# Patient Record
Sex: FEMALE | Race: WHITE | HISPANIC OR LATINO | Employment: UNEMPLOYED | URBAN - METROPOLITAN AREA
[De-identification: names, ages, dates, MRNs, and addresses within clinical notes are randomized per-mention and may not be internally consistent; named-entity substitution may affect disease eponyms.]

---

## 2018-04-18 LAB
AMORPHOUS MATERIAL (HISTORICAL): NORMAL
BACTERIA UR QL AUTO: NORMAL
BILIRUB UR QL STRIP: NEGATIVE MG/DL
CASTS/CASTS TYPE (HISTORICAL): NORMAL /LPF
CLARITY UR: CLEAR
COLOR UR: YELLOW
CRYSTAL TYPE (HISTORICAL): NORMAL /HPF
GLUCOSE UR STRIP-MCNC: NEGATIVE MG/DL
HGB UR QL STRIP.AUTO: ABNORMAL
KETONES UR STRIP-MCNC: NEGATIVE MG/DL
LEUKOCYTE ESTERASE UR QL STRIP: ABNORMAL
MUCOUS THREADS URNS QL MICRO: NORMAL
NITRITE UR QL STRIP: NEGATIVE
NON-SQ EPI CELLS URNS QL MICRO: NORMAL
OTHER STN SPEC: NORMAL
PH UR STRIP.AUTO: 6 [PH] (ref 4.5–8)
PROT UR STRIP-MCNC: NEGATIVE MG/DL
RBC #/AREA URNS AUTO: 2 /HPF
SP GR UR STRIP.AUTO: 1.02 (ref 1–1.04)
UROBILINOGEN UR QL STRIP.AUTO: NEGATIVE MG/DL (ref 0–1)
WBC #/AREA URNS AUTO: 2 /HPF

## 2018-04-30 LAB
ABSOL LYMPHOCYTES (HISTORICAL): 2 K/UL (ref 0.5–4)
ALBUMIN SERPL BCP-MCNC: 3.9 G/DL (ref 3–5.2)
ALP SERPL-CCNC: 62 U/L (ref 43–122)
ALT SERPL W P-5'-P-CCNC: 28 U/L (ref 9–52)
ANION GAP SERPL CALCULATED.3IONS-SCNC: 9 MMOL/L (ref 5–14)
AST SERPL W P-5'-P-CCNC: 17 U/L (ref 14–36)
BASOPHILS # BLD AUTO: 0 K/UL (ref 0–0.1)
BASOPHILS # BLD AUTO: 1 % (ref 0–1)
BILIRUB SERPL-MCNC: 0.7 MG/DL
BILIRUB UR QL STRIP: NEGATIVE MG/DL
BUN SERPL-MCNC: 15 MG/DL (ref 5–25)
CALCIUM SERPL-MCNC: 9.2 MG/DL (ref 8.4–10.2)
CHLORIDE SERPL-SCNC: 104 MEQ/L (ref 97–108)
CHOLEST SERPL-MCNC: 123 MG/DL
CHOLEST/HDLC SERPL: 3.8 {RATIO}
CLARITY UR: CLEAR
CO2 SERPL-SCNC: 29 MMOL/L (ref 22–30)
COLOR UR: YELLOW
CREATINE, SERUM (HISTORICAL): 0.61 MG/DL (ref 0.6–1.2)
DEPRECATED RDW RBC AUTO: 13.3 %
EGFR (HISTORICAL): >60 ML/MIN/1.73 M2
EOSINOPHIL # BLD AUTO: 0.1 K/UL (ref 0–0.4)
EOSINOPHIL NFR BLD AUTO: 2 % (ref 0–6)
GLUCOSE SERPL-MCNC: 94 MG/DL (ref 70–99)
GLUCOSE UR STRIP-MCNC: NEGATIVE MG/DL
HCT VFR BLD AUTO: 39.6 % (ref 36–46)
HDLC SERPL-MCNC: 32 MG/DL
HGB BLD-MCNC: 13 G/DL (ref 12–16)
HGB UR QL STRIP.AUTO: NEGATIVE
KETONES UR STRIP-MCNC: NEGATIVE MG/DL
LDL/HDL RATIO (HISTORICAL): 2.6
LDLC SERPL CALC-MCNC: 82 MG/DL
LEUKOCYTE ESTERASE UR QL STRIP: NEGATIVE
LYMPHOCYTES NFR BLD AUTO: 26 % (ref 25–45)
MCH RBC QN AUTO: 28 PG (ref 26–34)
MCHC RBC AUTO-ENTMCNC: 32.9 % (ref 31–36)
MCV RBC AUTO: 85 FL (ref 80–100)
MONOCYTES # BLD AUTO: 0.6 K/UL (ref 0.2–0.9)
MONOCYTES NFR BLD AUTO: 8 % (ref 1–10)
NEUTROPHILS ABS COUNT (HISTORICAL): 4.8 K/UL (ref 1.8–7.8)
NEUTS SEG NFR BLD AUTO: 63 % (ref 45–65)
NITRITE UR QL STRIP: NEGATIVE
PH UR STRIP.AUTO: 6 [PH] (ref 4.5–8)
PLATELET # BLD AUTO: 198 K/MCL (ref 150–450)
POTASSIUM SERPL-SCNC: 4.6 MEQ/L (ref 3.6–5)
PROT UR STRIP-MCNC: NEGATIVE MG/DL
RBC # BLD AUTO: 4.65 M/MCL (ref 4–5.2)
SODIUM SERPL-SCNC: 142 MEQ/L (ref 137–147)
SP GR UR STRIP.AUTO: 1.02 (ref 1–1.04)
TOTAL PROTEIN (HISTORICAL): 7.1 G/DL (ref 5.9–8.4)
TRIGL SERPL-MCNC: 44 MG/DL
TSH SERPL DL<=0.05 MIU/L-ACNC: 2.14 UIU/ML (ref 0.47–4.68)
UROBILINOGEN UR QL STRIP.AUTO: NEGATIVE MG/DL (ref 0–1)
VLDLC SERPL CALC-MCNC: 9 MG/DL (ref 0–40)
WBC # BLD AUTO: 7.5 K/MCL (ref 4.5–11)

## 2018-04-30 PROCEDURE — G0145 SCR C/V CYTO,THINLAYER,RESCR: HCPCS | Performed by: PATHOLOGY

## 2018-04-30 PROCEDURE — G0124 SCREEN C/V THIN LAYER BY MD: HCPCS | Performed by: PATHOLOGY

## 2018-04-30 PROCEDURE — 87624 HPV HI-RISK TYP POOLED RSLT: CPT | Performed by: FAMILY MEDICINE

## 2018-05-01 ENCOUNTER — LAB REQUISITION (OUTPATIENT)
Dept: LAB | Facility: HOSPITAL | Age: 38
End: 2018-05-01
Payer: COMMERCIAL

## 2018-05-01 DIAGNOSIS — Z01.419 ENCOUNTER FOR GYNECOLOGICAL EXAMINATION WITHOUT ABNORMAL FINDING: ICD-10-CM

## 2018-05-01 DIAGNOSIS — Z11.51 ENCOUNTER FOR SCREENING FOR HUMAN PAPILLOMAVIRUS (HPV): ICD-10-CM

## 2018-05-01 LAB
CANDIDA ANTIGEN DETECTION (HISTORICAL): NORMAL
CHLAMYDIA TRACHOMATIS DNA SDA,GENITAL (HISTORICAL): NORMAL
GARDNERELLA VAGINALIS (HISTORICAL): NORMAL
N GONORRHOEAE DNA SDA,GENITAL (HISTORICAL): NORMAL
TRICHOMONAS (HISTORICAL): NORMAL

## 2018-05-02 LAB — HPV RRNA GENITAL QL NAA+PROBE: ABNORMAL

## 2018-05-03 LAB
LAB AP GYN PRIMARY INTERPRETATION: NORMAL
LAB AP LMP: NORMAL
Lab: NORMAL
PATH INTERP SPEC-IMP: NORMAL

## 2021-04-30 ENCOUNTER — IMMUNIZATIONS (OUTPATIENT)
Dept: FAMILY MEDICINE CLINIC | Facility: HOSPITAL | Age: 41
End: 2021-04-30

## 2021-04-30 DIAGNOSIS — Z23 ENCOUNTER FOR IMMUNIZATION: Primary | ICD-10-CM

## 2021-04-30 PROCEDURE — 91301 SARS-COV-2 / COVID-19 MRNA VACCINE (MODERNA) 100 MCG: CPT

## 2021-04-30 PROCEDURE — 0011A SARS-COV-2 / COVID-19 MRNA VACCINE (MODERNA) 100 MCG: CPT

## 2021-05-27 ENCOUNTER — IMMUNIZATIONS (OUTPATIENT)
Dept: FAMILY MEDICINE CLINIC | Facility: HOSPITAL | Age: 41
End: 2021-05-27

## 2021-05-27 DIAGNOSIS — Z23 ENCOUNTER FOR IMMUNIZATION: Primary | ICD-10-CM

## 2021-05-27 PROCEDURE — 0012A SARS-COV-2 / COVID-19 MRNA VACCINE (MODERNA) 100 MCG: CPT

## 2021-05-27 PROCEDURE — 91301 SARS-COV-2 / COVID-19 MRNA VACCINE (MODERNA) 100 MCG: CPT

## 2022-03-24 ENCOUNTER — OFFICE VISIT (OUTPATIENT)
Dept: FAMILY MEDICINE CLINIC | Facility: CLINIC | Age: 42
End: 2022-03-24

## 2022-03-24 VITALS
TEMPERATURE: 97.1 F | DIASTOLIC BLOOD PRESSURE: 72 MMHG | SYSTOLIC BLOOD PRESSURE: 128 MMHG | RESPIRATION RATE: 18 BRPM | HEIGHT: 62 IN | HEART RATE: 83 BPM | BODY MASS INDEX: 50.35 KG/M2 | OXYGEN SATURATION: 99 % | WEIGHT: 273.6 LBS

## 2022-03-24 DIAGNOSIS — Z11.4 SCREENING FOR HIV (HUMAN IMMUNODEFICIENCY VIRUS): ICD-10-CM

## 2022-03-24 DIAGNOSIS — R13.10 DYSPHAGIA, UNSPECIFIED TYPE: ICD-10-CM

## 2022-03-24 DIAGNOSIS — K21.9 GASTROESOPHAGEAL REFLUX DISEASE, UNSPECIFIED WHETHER ESOPHAGITIS PRESENT: ICD-10-CM

## 2022-03-24 DIAGNOSIS — Z11.59 NEED FOR HEPATITIS C SCREENING TEST: ICD-10-CM

## 2022-03-24 DIAGNOSIS — E66.01 CLASS 3 SEVERE OBESITY DUE TO EXCESS CALORIES WITHOUT SERIOUS COMORBIDITY WITH BODY MASS INDEX (BMI) OF 50.0 TO 59.9 IN ADULT (HCC): ICD-10-CM

## 2022-03-24 DIAGNOSIS — Z00.00 HEALTHCARE MAINTENANCE: ICD-10-CM

## 2022-03-24 DIAGNOSIS — Z12.31 BREAST CANCER SCREENING BY MAMMOGRAM: ICD-10-CM

## 2022-03-24 DIAGNOSIS — Z00.00 ANNUAL PHYSICAL EXAM: Primary | ICD-10-CM

## 2022-03-24 DIAGNOSIS — Z13.31 DEPRESSION SCREEN: ICD-10-CM

## 2022-03-24 PROBLEM — E66.813 CLASS 3 SEVERE OBESITY DUE TO EXCESS CALORIES WITHOUT SERIOUS COMORBIDITY WITH BODY MASS INDEX (BMI) OF 50.0 TO 59.9 IN ADULT (HCC): Status: ACTIVE | Noted: 2022-03-24

## 2022-03-24 PROCEDURE — 99386 PREV VISIT NEW AGE 40-64: CPT | Performed by: PHYSICIAN ASSISTANT

## 2022-03-24 NOTE — ASSESSMENT & PLAN NOTE
- Stable  Continue Tums as needed  - Reviewed the causes of heartburn  Discussed importance of diet and lifestyle modifications to control GERD symptoms  Avoid things which worsen heartburn (Ex: Caffiene, tomato based products, spicy foods, tobacco, alcohol, obesity, tight-fitting clothing ) Discussed importance of eating small frequent meals instead of large meals  While laying down/sleeping instructed to refrain from laying flat and instead, prop pillows up behind their back  Instructed to not lay down 2-3 hours following a meal    - Will continue to monitor

## 2022-03-24 NOTE — PATIENT INSTRUCTIONS
[facility]  Phone Number: [phone]  - Please call to schedule an appointment  Thanks! Visita de bienestar para los adultos   CUIDADO AMBULATORIO:   Angel visita de bienestar es cuando usted acude con un médico para que le william exámenes de detección de problemas de [place]  Brown médico también le dará consejos sobre cómo mantenerse saludable  Anote more preguntas para que se acuerde de hacerlas  Pregunte a brown médico con qué frecuencia debería realizarse angel visita de bienestar  Lo que sucede en angel visita de bienestar: Brown médico le preguntará sobre brown akhil y brown historia familiar [id number] [facility]  [place] incluye presión arterial durga, enfermedades del corazón y cáncer  El médico le preguntará si tiene síntomas que le preocupen, si [facility] y [place] de ánimo  También se le preguntará acerca del uso de medicamentos, suplementos, alimentos y alcohol  Es posible que le william cualquiera de lo siguiente:  · Brown peso será revisado  Es posible que [facility] midan brown altura para calcular brown índice de masa corporal [facility]  [facility] indica si tiene un peso saludable  · Se verificarán brown presión arterial y frecuencia cardíaca  También pueden revisar brown temperatura  · Exámenes de [facility] y [facility] podría realizarse  Se podrían realizar exámenes de cornel para revisar los niveles de [facility]  Los niveles anormales de colesterol aumentan el riesgo de enfermedad del corazón y accidente cerebrovascular  Puede que también necesite angel prueba de cornel u orina para revisar si tiene diabetes si usted está en mayor riesgo  Las pruebas de orina pueden hacerse para buscar signos de angel infección o angel enfermedad renal     · Un examen físico incluye la comprobación de more latidos del corazón y los pulmones con un estetoscopio  Brown médico también podría revisarle la piel para buscar daños causados por el sol  · Pruebas de detección podría recomendarse   Se realiza un examen de detección para detectar enfermedades que pueden no causar síntomas  Los exámenes de detección que necesite dependen de brown edad, género, antecedentes familiares y hábitos de lacie  Por ejemplo, podrían recomendarle la exploración selectiva colorrectal si tiene 48 años o más  Si es [place], necesita los siguientes exámenes de detección:  · El examen de Papanicolaou se utiliza para detectar cáncer de fely uterino  El examen del Papanicolaou por lo general se realiza entre 3 a 5 años dependiendo de brown edad  Puede necesitarlo más a menudo si usted ha tenido [employer] de la prueba de Papanicolaou en el pasado  Pregunte a brown médico con qué frecuencia debería realizarse un examen de Papanicolaou  · Erven Mcadams es angel radiografía de more mamas para detectar cáncer de mama  Los expertos recomiendan [address] cada 2 años empezando a los 48 años de [place]  Es probable que usted necesite Erven Mcadams a los 52 años o antes si tiene riesgo alto de cáncer de seno  Hable con brown médico sobre cuándo debe empezar con more mamografías y con cuánta frecuencia las necesita  Vacunas que podría necesitar:  · Debe recibir angel vacuna contra la influenza todos los [place]  La vacuna contra la influenza protege de la gripe  Varios tipos de virus causan la influenza  Debido a que los virus [place] con el Mark, es necesaria la producción de nuevas vacunas cada año  · Debe recibir Shahla Musty vacuna de refuerzo contra el tétanos-difteria (Td) cada 10 años  Esta vacuna protege contra el tétanos y la difteria  El tétanos es angel infección severa que puede causar trismo y espasmos musculares dolorosos  La difteria es angel infección bacteriana grave que produce angel cubierta gruesa en la parte de atrás de la boca y garganta  · Debe recibir la vacuna contra el virus del papiloma humano (VPH) si usted es tammi y [place] 19 y 32 años o es hombre y [place] 23 y 24 años y nunca la recibió  Esta vacuna protege contra la infección por VPH   El virus del papiloma humano o VPH es la infección más común que se transmite por contacto sexual  El virus del papiloma humano también podría provocar cáncer vaginal, del pene y del ano  · Debe recibir la vacuna antineumocócica si tiene más de 72 años  La vacuna antineumocócica es angel inyección que se administra para protegerlo contra angel enfermedad neumocócica  La enfermedad neumocócica es angel infección causada por la bacteria neumocócica  La infección puede causar neumonía, meningitis o angel infección del oído  · Debe recibir la vacuna contra la culebrilla Si tiene 61 años o más, incluso si ha tenido culebrilla antes  La vacuna contra la culebrilla (herpes zóster) es angel inyección usada para proteger contra el virus zóster que causa la varicela  Brenda es el mismo virus que causa la varicela  La culebrilla es un sarpullido doloroso que se desarrolla en personas que tuvieron varicela o [place] estado expuestas al virus  Cómo comer saludable: [place] es un modelo para planear comidas sanas  Muestra los tipos y cantidades de alimentos que deberían ir en un plato  Benjamine Bryon y verduras representan alrededor de la mitad de brown plato y los granos y proteínas representan la otra mitad  Se incluye angel porción de productos lácteos al lado del plato  La cantidad de calorías y [address] de las porciones que usted necesita dependen de brown edad, [place], peso y altura  Los ejemplos de alimentos saludables son:  · Consuma angel variedad de verduras jazzmine las de color tatiana oscuro, wang y [place]  Usted también puede incluir verduras enlatadas bajas en sodio (sal) y verduras congeladas sin mantequilla ni salsas [id number]  · Consuma angel variedad de fruta frescas , las frutas enlatadas en 100% de jugo, fruta [place] y georgie secos  · Incluya granos enteros   Por lo menos la mitad de los granos que usted consume deben ser granos de lucrecia integral  Por ejemplo, panes de grano entero, pasta integral, arroz integral y cereales de grano entero jazzmine la bill  · Consuma angel variedad de alimentos con proteínas jazzmine mariscos (pescado y crustáceos), [place] Zaina y carne de ave sin piel (pavo y win)  Ejemplos de gayatri magras incluyen pierna, paleta o lomo de puerco y joseph, solomillo o, lomo de res y carne [place] de res extra [place]  Otros alimentos ricos en proteínas son los huevos y sustitutos de [place], frijoles, chícharos, productos de soya, nueces y semillas  · Elija productos lácteos bajos en grasa [employer] o del 1% o yogur, queso y requesón bajos en grasa  · Limite las grasas poco saludables, jazzmine la [place], la margarina dura y la [place]  Ejercicio: Realice angel actividad física por lo menos 30 minutos al día, la mayoría de los días de la [place]  Algunos de los ejercicios incluyen caminar, montar en bicicleta, bailar y nadar  Usted también puede realizar más actividad física usando las escaleras en vez de los ascensores o estacionarse más lejos cuando Oral Nan a las tiendas  Incluya ejercicios para fortalecer los músculos 2 días a la semana  El ejercicio regular proporciona muchos beneficios para la akhil  Maria G Pae a controlar brown peso y [employer] riesgo de diabetes tipo 2, presión arterial durga, enfermedad del corazón y accidente cerebrovascular  El ejercicio [employer] puede ayudar a mejorar brown estado de ánimo  Pregunte a brown médico acerca del mejor plan de ejercicio para usted  Pautas generales de akhil y seguridad:  · No fume  La nicotina y otras sustancias químicas que contienen los cigarrillos y cigarros pueden dañar los pulmones  Pida información a brown médico si usted actualmente fuma y necesita ayuda para dejar de fumar  Los cigarrillos electrónicos o el tabaco sin humo igualmente contienen nicotina  Consulte con brown médico antes de [employer]  · Limite el consumo de alcohol  Un trago equivale a 12 onzas de cerveza, 5 onzas de vino o 1 onza y ½ de licor  · Baje de peso, si es necesario   El sobrepeso [employer] de ciertas condiciones de [place]  Estos incluyen enfermedad del corazón, presión arterial durga, diabetes tipo 2 y ciertos tipos de cáncer  · Proteja brown piel  No tome el sol ni use francie de bronceado  Use un protector solar con un FPS mayor a 13  Aplíquese el bloqueador por lo menos 15 minutos antes de que vaya a estar al [employer]  Vuelva a aplicarse la crema solar cada 2 horas  Use ropa protectora, sombrero y lentes para el sol cuando se encuentre afuera  · Conduzca con seguridad  Use siempre brown cinturón de seguridad  Asegúrese que todos en el fabiano usan el cinturón de seguridad  Un cinturón de seguridad puede salvar brown lacie en silvestre de un accidente automovilístico  No use el celular cuando esté manejando  [place] puede hacer que se distraiga y causar un accidente  Es mejor que pare y se orille si necesita hacer angel Pierce Meiers un texto  · Practique el sexo seguro  Use condones de látex si es sexualmente [place] y tiene más de [place]  Brown médico puede recomendar exámenes de detección de infecciones de transmisión sexual (ITS)  · Use un miguel a, un chaleco salvavidas y unos implementos de protección  Siempre use un miguel a al [employer] en bicicleta o motocicleta, esquiar o jugar deportes que podrían causar angel lesión en la crystal  Use implementos de protección cuando practique deportes  Use un chaleco salvavidas cuando esté en un bote o practicando actividades acuáticas  © Copyright GL 2ours 2022 Information is for End User's use only and may not be sold, redistributed or otherwise used for commercial purposes  All illustrations and images included in CareNotes® are the copyrighted property of A D A M , [address] es sólo para uso en educación  Brown intención no es darle un consejo médico sobre enfermedades o tratamientos   Colsulte con brown Ladena Creed farmacéutico antes de seguir cualquier régimen médico para saber si es seguro y efectivo para usted  Disfagia   LO QUE NECESITA SABER:   La disfagia es la dificultad para tragar  Puede tener problemas para que la comida o los líquidos pasen de brown boca hacia brown esófago y lleguen a brown estómago  Puede tener el problema cuando usted come, ingiere líquidos o en cualquier momento que intente tragar  La disfagia puede durar [place] o ser un [employer]  INSTRUCCIONES SOBRE EL [facility]PITAL[facility]IA:   Llame al Janie Heart de emergencias local (911 en los Estados Unidos) si:  · Usted tiene dolor en el pecho  · Le falta el aire  Regrese a la bessie de emergencias si:  · Usted se ahoga con brown propia saliva  · Usted no puede comer nada ni ingerir ningún líquido  Llame a brown médico o terapeuta si:  · Usted pierde peso sin proponérselo  · More signos y [address], o usted tiene nuevos signos o síntomas  · Usted tiene signos o síntomas de deshidratación, jazzmine un aumento en la sed, orina de color amarillo oscuro, poca o ninguna orina  · Usted se resfría con frecuencia  · Usted tiene preguntas o inquietudes acerca de brown condición o cuidado  Nutrición: Es posible que necesite cambiar la textura de los alimentos que consume para evitar el riesgo de atorarse  Brown médico podría mostrarle cómo espesar los líquidos o [employer] alimentos para hacerlos más fáciles de tragar  Un terapeuta puede enseñarle diferentes maneras de tragar [employer] posiciones de la crystal y del cuerpo  Podrían enseñarle ejercicios para fortalecer los músculos que lo ayudan a tragar  Acuda a more consultas de control con brown médico o terapeuta según le indicaron: Anote more preguntas para que se acuerde de hacerlas maribel more visitas  © Copyright [facility] 2022 Information is for End User's use only and may not be sold, redistributed or otherwise used for commercial purposes   All illustrations and images included in CareNotes® are the copyrighted property of A D A M , Inc  or [address]  Esta información es sólo para uso en educación  Brown intención no es darle un consejo médico sobre enfermedades o tratamientos  Colsulte con brown [employer] farmacéutico antes de seguir cualquier régimen médico para saber si es seguro y efectivo para usted  Enfermedad por reflujo gastroesofágico (ERGE)   LO QUE NECESITA SABER:   La enfermedad por reflujo gastroesofágico (Arvell Cande) es el reflujo que ocurre más de 2 veces a la semana maribel varias semanas  Reflujo significa que el ácido y los alimentos en el estómago regresan al esófago  La ERGE puede causar otros problemas de akhil con el tiempo si no es tratada  INSTRUCCIONES SOBRE EL [facility]PITAL[facility]IA:   Llame al [facility] de emergencias local (911 en los Estados [place]idos) si:  · Usted siente dolor jamal en el pecho y dificultad repentina para respirar  Regrese a la bessie de emergencias si:  · Tiene dificultad para respirar después de vomitar  · Tiene dificultad para tragar o dolor al tragar  · More evacuaciones intestinales son de color jarek, con Kelly, o de apariencia alquitranada  · Brown vómito parece jazzmine café molido o contiene cornel  Llame a brown médico o gastroenterólogo si:  · Usted siente [place] y no puede eructar o vomitar  · Vomita grandes cantidades, o vomita con frecuencia  · Usted pierde peso sin proponérselo  · More síntomas empeoran o no mejoran con el tratamiento  · Usted tiene preguntas o inquietudes acerca de brown condición o cuidado  Medicamentos:  · Los medicamentos para disminuir el ácido estomacal  Los medicamentos también podrían usarse para ayudar a que brown esfínter esofágico inferior y brown estómago se contraigan (estrechen) más  · [place] more medicamentos jazzmine se le haya indicado  Consulte con brown médico si usted dmitri que brown medicamento no le está ayudando o si presenta efectos secundarios  Infórmele si es alérgico a algún medicamento   Mantenga angel lista actualizada de los [place], las vitaminas y los productos herbales que sandeep  Incluya los siguientes datos de los medicamentos: cantidad, frecuencia y motivo de administración  Traiga con usted la lista o los envases de las píldoras a more citas de seguimiento  Lleve la lista de los medicamentos con usted en silvestre de angel emergencia  Control de la ERGE:      · No consuma alimentos o bebidas que puedan aumentar la [place]  Estos incluyen chocolate, menta, comidas fritas o grasosas, bebidas que contienen cafeína o bebidas gaseosas  Otros alimentos incluyen comidas picantes, cebollas, tomates y alimentos a base de tomate  No consuma alimentos y bebidas que puedan irritar brown esófago, jazzmine las frutas cítricas, los jugos y las bebidas alcohólicas  · No ingiera comidas abundantes  Cuando usted come [employer] a la vez, brown estómago necesita más ácido para digerirla  Consuma 6 comidas pequeñas al día en vez de 3 comidas grandes y coma lentamente  No consuma alimentos entre 2 y 3 horas antes de [place]  · Eleve la cabecera de brown cama  Coloque bloques de 6 pulgadas debajo de la cabecera de la estructura de brown cama  También podría usar más angel almohada para apoyar brown crystal y hombros mientras duerme  · Mantenga un peso saludable  Si usted tiene sobrepeso, la pérdida de peso podría ayudar a aliviar los síntomas de la [id number]-[id number]  · No fume  Fumar debilita el esfínter esofágico inferior y [place] el riesgo de [id number]  Pida información a brown médico si usted actualmente fuma y necesita ayuda para dejar de fumar  Los cigarrillos electrónicos o el tabaco sin humo igualmente contienen nicotina  Consulte con brown médico antes de [employer]  · No aplique presión sobre el abdomen  La presión empuja el ácido hacia el esófago  No use ropa que Melissa alrededor de [employer]  No se agache  Doble las rodillas para recoger algo      Acuda a more consultas de control con brown médico o gastroenterólogo según le indicaron: Anote more preguntas para que se acuerde de hacerlas maribel more visitas  © Copyright [employer] 2022 Information is for End User's use only and may not be sold, redistributed or otherwise used for commercial purposes  All illustrations and images included in CareNotes® are the copyrighted property of A D A M , Inc  or [facility] es sólo para uso en educación  Brown intención no es darle un consejo médico sobre enfermedades o tratamientos  Colsulte con brown Ileana Angst farmacéutico antes de seguir cualquier régimen médico para saber si es seguro y efectivo para usted  Control del peso   LO QUE NECESITA SABER:   Tener sobrepeso aumenta brown riesgo de presentar problemas de akhil jazzmine enfermedades del corazón, hipertensión, diabetes tipo 2 y ciertos tipos de cáncer  También puede aumentar brown riesgo de presentar osteoartritis, apnea del sueño y otros problemas respiratorios  Trate de bajar de peso de forma gradual y [place]  Incluso angel mínima pérdida de peso puede disminuir brown riesgo de problemas de [place]  INSTRUCCIONES SOBRE EL [facility]PITALARIA:   Cómo bajar de peso de Sary Peek forma lund: [place] forma lund y saludable para perder peso es consumir menos calorías y realizar angel actividad física en forma regular  · Usted puede perder hasta 1 abhishek por semana al reducir el consumo de 500 calorías cada día  Usted puede reducir el consumo de calorías al comer porciones más pequeñas o eliminar los alimentos con alto contenido de calorías  Marce las etiquetas para determinar la cantidad de calorías que contienen los alimentos que consume  · También puede quemar calorías al realizar ejercicio jazzmine caminar, nadar o montar en bicicleta  Es más probable que usted [employer] peso si hace de estos cambios parte de brown estilo de lacie  Realice angel actividad física por lo menos 30 minutos al día, la mayoría de los días de la [place]   Usted también puede realizar más actividad física usando las escaleras en vez de los ascensores o estacionarse más lejos cuando Lucho Hones a las tiendas  Pregunte a brown médico acerca del mejor plan de ejercicio para usted  Plan de alimentación saludable para el control del peso: Un plan de alimentación saludable incluye angel variedad de alimentos, contiene más pocas calorías y lo ayuda a estar saludable  Un plan de alimentación saludable incluye lo siguiente:     · Consuma alimentos de grano integral con más frecuencia  Un plan de alimentación saludable debe contener alimentos con fibra  La fibra es la parte de las frutas, verduras y granos que brown cuerpo no puede digerir  Los alimentos de granos integrales son saludables y suministran fibra adicional a brown Yaya Penning  Algunos ejemplos de alimentos de granos integrales son los panes integrales, pastas integrales, la bill, el arroz integral y lucrecia de bulgur  · Consuma angel variedad de verduras todos los días  [address], coliflor, col kailee y [place]  Coma verduras anaranjadas jazzmine las zanahorias, monse dulces y calabaza de invierno  · Consuma angel variedad de frutas todos los dionne  Escoja frutas frescas o enlatadas en brown propio jugo o con jugo bajo en [place] en vez de jugo  El [place] de frutas tiene [address] o roc nada de [place]  · Consuma productos lácteos con bajo contenido de [place]  [address] de 1%  Consuma yogur descremado y requesón (cottage) semidescremado  Trate de consumir quesos descremados jazzmine el queso mozzarela y otros quesos semidescremado  · Seleccione gayatri y otros alimentos con proteínas bajos en grasa  Escoja frijoles u [address]  Seleccione pescado, carne de aves sin piel (jazzmine el win o [place]), brandt de keyure [place] (de res o de cerdo)  Antes de cocinar las gayatri o las aves emeli cualquier parte de grasa visible  · Use menos grasas y aceites  Trate de hornear los alimentos en lugar de freírlos   Trate de [employer] en lugar de freírlos  Consuma menos alimentos de alto tenor graso  Coma menos alimentos altos en grasa jazzmine las monse fritas, donas, helados y pasteles  · Consuma menos dulces  Limite los alimentos y las bebidas con un gran contenido de azúcar  Estos incluyen los caramelos, galletas, gaseosas normales y bebidas endulzadas  Formas de reducir las calorías:  · [address] porciones  ? Use platos pequeños para servirse porciones pequeñas  ? No coma segundas porciones  ? Cuando coma en un restaurante, pida angel Jonel Fidelity y guarde en fiona la mitad de la comida antes de empezar a comer  ? Comparta con alguien un plato de entrada  · Reemplace los bocadillos o meriendas altos en calorías por los saludables de menos calorías  ? Escoja frutas frescas, verduras, galletas de arroz bajo en grasa o palomitas de maíz en lugar de comer monse fritas de paquete, nueces o dulces de chocolate  ? [employer] agua o bebidas dietéticas en lugar de las endulzadas  · No vaya al babcock cuando tenga hambre  Usted podría ser más propenso a elegir alimentos no saludables  Lleve angel lista de alimentos saludables y vaya de compras después de jalil comido  · Coma more comidas regularmente  No se salte ninguna comida  No omita ninguna comida porque esto puede conducir a comer más a angel hora más tarde del día  [place] podría traerle problemas para perder peso  Si no tiene tiempo para hacer comidas regulares, consuma un refrigerio saludable  Hable con un dietista para que lo ayude a crear un plan de comidas y un horario que suzi adecuados para usted  Otras cosas que debe tener en cuenta mientras trata de bajar de peso:  · Esté consciente de las situaciones que podrían ocasionarle ganas de comer en exceso, jazzmine el comer mientras clive la televisión  Busque formas para evitar estas situaciones  Por ejemplo, leer un libro, caminar o hacer trabajos manuales      · Reúnase con un carey de apoyo o con personas que [facility] están tratando de bajar de [place]  [place] le puede ayudar a mantenerse motivado y continuar progresando en brown objetivo de perder peso  © Copyright [employer] 2022 Information is for End User's use only and may not be sold, redistributed or otherwise used for commercial purposes  All illustrations and images included in CareNotes® are the copyrighted property of A D A M , [facility]  or [address] [place] [address] es sólo para uso en educación  Borwn intención no es darle un consejo médico sobre enfermedades o tratamientos  Colsulte con brown Ozie Sharp farmacéutico antes de seguir cualquier régimen médico para saber si es seguro y efectivo para usted

## 2022-03-24 NOTE — ASSESSMENT & PLAN NOTE
- Discussed healthy eating, portion sizes with patient  Advised to start regular exercise routine  Provided patient with handouts  - Offered patient referral to weight management, but she declines at this time, but notes she may be interested in the future

## 2022-03-24 NOTE — ASSESSMENT & PLAN NOTE
- Patient has been experiencing difficulty with swallowing solids for many years and requires to drink lots of water when swallowing     - Will order barium swallow study for further evaluation     - Will continue to monitor

## 2022-03-24 NOTE — PROGRESS NOTES
Assessment/Plan:     Gastroesophageal reflux disease  - Stable  Continue Tums as needed  - Reviewed the causes of heartburn  Discussed importance of diet and lifestyle modifications to control GERD symptoms  Avoid things which worsen heartburn (Ex: Caffiene, tomato based products, spicy foods, tobacco, alcohol, obesity, tight-fitting clothing ) Discussed importance of eating small frequent meals instead of large meals  While laying down/sleeping instructed to refrain from laying flat and instead, prop pillows up behind their back  Instructed to not lay down 2-3 hours following a meal    - Will continue to monitor  Dysphagia  - Patient has been experiencing difficulty with swallowing solids for many years and requires to drink lots of water when swallowing     - Will order barium swallow study for further evaluation     - Will continue to monitor  Class 3 severe obesity due to excess calories without serious comorbidity with body mass index (BMI) of 50 0 to 59 9 in [facility])  - Discussed healthy eating, portion sizes with patient  Advised to start regular exercise routine  Provided patient with handouts  - Offered patient referral to weight management, but she declines at this time, but notes she may be interested in the future  Return in about 3 months (around 6/24/2022) for Next scheduled follow up difficulty swallowing, weight  Diagnoses and all orders for this visit:    Annual physical exam    Breast cancer screening by mammogram  -     Mammo screening bilateral w 3d & cad; Future    Depression screen    Healthcare maintenance  -     TSH, 3rd generation with Free T4 reflex; Future    Need for hepatitis C screening test  -     Hepatitis C antibody;  Future    Screening for HIV (human immunodeficiency virus)  -     HIV 1/2 Antigen/Antibody (4th Generation) w Reflex [facility]; Future    Dysphagia, unspecified type  -     FL barium swallow ROUTINE; Future    Gastroesophageal reflux disease, unspecified whether esophagitis present    Class 3 severe obesity due to excess calories without serious comorbidity with body mass index (BMI) of 50 0 to 59 9 in adult (HCC)  -     CBC and differential; Future  -     Comprehensive metabolic panel; Future  -     Lipid panel; Future  -     Hemoglobin A1C; Future        All of patients questions were answered  Patient understands and agrees with the above plan  Essie Paez PA-C  22  [facility]       Subjective:     Teofilo Portillo is a 42 y o  female who  has no past medical history on file  who presented to the office today to establish care  - Patient is a 42 y o  female who presents today to establish care  Patient denies any known chronic medical conditions and denies taking any daily medications  Patient notes about 2 months ago her brother, at age 40, was diagnosed with liver cancer and is in need of liver transplant      - Patient notes for a while she has been experiencing difficulty with swallowing foods every time she eats  Patient notes she has to drink a lot of water when eating  Patient denies any difficulty with liquids and denies any pain with swallowing  Patient notes occasionally she also does experience gastric reflux and will takes tums as needed  - Patient notes she would like to lose weight  Patient notes she has tried dieting and taking diet pills  Patient notes she does lose weight, but then she does not stick with it and gains the weight back  Patient notes she has been very busy with her 4 children, 2 of which are autistic  Patient notes she often does not focus on herself  Dental Regular Visits: Not recently, but would like to  Vision Problems: No    Life Style  Tobacco Use: No  Alcohol Use: No  Drug Use: No    Reproductive Health  Contraception: Tubal ligation  LMP: 3/24/2022  OB History: ; all vaginal deliveries       Breast Cancer Screening:  - Risks and benefits discussed   Never had mammogram before  Colorectal Cancer Screening:   - Risks and benefits discussed  Patient does not yet meet the requirements to complete this screening  Cervical Cancer Screening:  - Risks and benefits discussed  Last PAP was about 2 years ago  Denies any history of abnormal PAPs  STD Testing:  - Risks and benefits discussed  No current outpatient medications on file prior to visit  No current facility-administered medications on file prior to visit  History reviewed  No pertinent past medical history  Past Surgical History:   Procedure Laterality Date    TUBAL LIGATION       Social History     Socioeconomic History    Marital status: Single     Spouse name: None    Number of children: None    Years of education: None    Highest education level: None   Occupational History    None   Tobacco Use    Smoking status: Never Smoker    Smokeless tobacco: Never Used   Substance and Sexual Activity    Alcohol use: Never    Drug use: Never    Sexual activity: None   Other Topics Concern    None   Social History Narrative    None     Social Determinants of Health     Financial Resource Strain: Low Risk     Difficulty of Paying Living Expenses: Not very hard   Food Insecurity: No Food Insecurity    Worried About Running Out of Food in the Last Year: Never true    [place] of Food in the Last Year: Never true   Transportation Needs: No Transportation Needs    Lack of Transportation (Medical): No    Lack of Transportation (Non-Medical): No   Physical Activity: Not on file   Stress: Not on file   Social Connections: Not on file   Intimate Partner Violence: Not on file   Housing Stability: Not on file     Family History   Problem Relation Age of Onset    No Known Problems Mother     Hypertension Father     Diabetes Father     Liver cancer Brother 40         Review of Systems   Constitutional: Negative for appetite change, fatigue and fever  HENT: Negative for congestion and sore throat      Eyes: Negative for pain and visual disturbance  Respiratory: Negative for cough, chest tightness and shortness of breath  Cardiovascular: Negative for chest pain, palpitations and leg swelling  Gastrointestinal: Negative for abdominal pain, constipation, diarrhea, nausea and vomiting  Genitourinary: Negative for difficulty urinating and dysuria  Musculoskeletal: Negative for arthralgias and back pain  Skin: Negative for rash  Neurological: Negative for dizziness and headaches  Psychiatric/Behavioral: Negative for behavioral problems and suicidal ideas  The patient is not nervous/anxious  BMI Counseling: Body mass index is 50 04 kg/m²  The BMI is above normal  Nutrition recommendations include decreasing portion sizes, encouraging healthy choices of fruits and vegetables, decreasing fast food intake, consuming healthier snacks, limiting drinks that contain sugar, moderation in carbohydrate intake, increasing intake of lean protein, reducing intake of saturated and trans fat and reducing intake of cholesterol  Exercise recommendations include moderate physical activity 150 minutes/week  No pharmacotherapy was ordered  Rationale for BMI follow-up plan is due to patient being overweight or obese  Depression Screening and Follow-up Plan: Patient was screened for depression during today's encounter  They screened negative with a PHQ-2 score of 0  Objective:  /72 (BP Location: Left arm, Patient Position: Sitting, Cuff Size: Extra-Large)   Pulse 83   Temp (!) 97 1 °F (36 2 °C) (Temporal)   Resp 18   Ht 5' 2" (1 575 m)   Wt 124 kg (273 lb 9 6 oz)   LMP 03/24/2022 (Exact Date) Comment: currently on it   SpO2 99%   BMI 50 04 kg/m²     Physical Exam  Vitals and nursing note reviewed  Constitutional:       General: She is not in acute distress  Appearance: She is well-developed  HENT:      Head: Normocephalic and atraumatic        Right Ear: External ear normal       Left Ear: External ear normal       Nose: Nose normal    Eyes:      Conjunctiva/sclera: Conjunctivae normal    Cardiovascular:      Rate and Rhythm: Normal rate and regular rhythm  Pulses: Normal pulses  Heart sounds: Normal heart sounds  Pulmonary:      Effort: Pulmonary effort is normal  No respiratory distress  Breath sounds: Normal breath sounds  No wheezing  Abdominal:      General: Bowel sounds are normal       Palpations: Abdomen is soft  Tenderness: There is no abdominal tenderness  Musculoskeletal:         General: Normal range of motion  Cervical back: Normal range of motion and neck supple  Skin:     General: Skin is warm and dry  Neurological:      Mental Status: She is alert and oriented to person, place, and time  Psychiatric:         Behavior: Behavior normal        PHQ-2/9 Depression Screening    Little interest or pleasure in doing things: 0 - not at all  Feeling down, depressed, or hopeless: 0 - not at all  PHQ-2 Score: 0  PHQ-2 Interpretation: Negative depression screen         - Utilized [facility] services for translation

## 2022-05-11 ENCOUNTER — APPOINTMENT (OUTPATIENT)
Dept: LAB | Facility: HOSPITAL | Age: 42
End: 2022-05-11
Payer: COMMERCIAL

## 2022-05-11 DIAGNOSIS — Z11.59 NEED FOR HEPATITIS C SCREENING TEST: ICD-10-CM

## 2022-05-11 DIAGNOSIS — E66.01 CLASS 3 SEVERE OBESITY DUE TO EXCESS CALORIES WITHOUT SERIOUS COMORBIDITY WITH BODY MASS INDEX (BMI) OF 50.0 TO 59.9 IN ADULT (HCC): ICD-10-CM

## 2022-05-11 DIAGNOSIS — Z11.4 SCREENING FOR HIV (HUMAN IMMUNODEFICIENCY VIRUS): ICD-10-CM

## 2022-05-11 DIAGNOSIS — Z00.00 HEALTHCARE MAINTENANCE: ICD-10-CM

## 2022-05-11 LAB
ALBUMIN SERPL BCP-MCNC: 4 G/DL (ref 3–5.2)
ALP SERPL-CCNC: 85 U/L (ref 43–122)
ALT SERPL W P-5'-P-CCNC: 76 U/L
ANION GAP SERPL CALCULATED.3IONS-SCNC: 8 MMOL/L (ref 5–14)
AST SERPL W P-5'-P-CCNC: 80 U/L (ref 14–36)
BASOPHILS # BLD AUTO: 0.03 THOUSANDS/ΜL (ref 0–0.1)
BASOPHILS NFR BLD AUTO: 0 % (ref 0–1)
BILIRUB SERPL-MCNC: 1.37 MG/DL
BUN SERPL-MCNC: 6 MG/DL (ref 5–25)
CALCIUM SERPL-MCNC: 8.9 MG/DL (ref 8.4–10.2)
CHLORIDE SERPL-SCNC: 105 MMOL/L (ref 97–108)
CHOLEST SERPL-MCNC: 119 MG/DL
CO2 SERPL-SCNC: 29 MMOL/L (ref 22–30)
CREAT SERPL-MCNC: 0.67 MG/DL (ref 0.6–1.2)
EOSINOPHIL # BLD AUTO: 0.06 THOUSAND/ΜL (ref 0–0.61)
EOSINOPHIL NFR BLD AUTO: 1 % (ref 0–6)
ERYTHROCYTE [DISTWIDTH] IN BLOOD BY AUTOMATED COUNT: 12.9 % (ref 11.6–15.1)
GFR SERPL CREATININE-BSD FRML MDRD: 108 ML/MIN/1.73SQ M
GLUCOSE P FAST SERPL-MCNC: 138 MG/DL (ref 70–99)
HCT VFR BLD AUTO: 44.2 % (ref 34.8–46.1)
HCV AB SER QL: NORMAL
HDLC SERPL-MCNC: 23 MG/DL
HGB BLD-MCNC: 13.7 G/DL (ref 11.5–15.4)
IMM GRANULOCYTES # BLD AUTO: 0.02 THOUSAND/UL (ref 0–0.2)
IMM GRANULOCYTES NFR BLD AUTO: 0 % (ref 0–2)
LDLC SERPL CALC-MCNC: 80 MG/DL
LYMPHOCYTES # BLD AUTO: 1.99 THOUSANDS/ΜL (ref 0.6–4.47)
LYMPHOCYTES NFR BLD AUTO: 26 % (ref 14–44)
MCH RBC QN AUTO: 27.1 PG (ref 26.8–34.3)
MCHC RBC AUTO-ENTMCNC: 31 G/DL (ref 31.4–37.4)
MCV RBC AUTO: 88 FL (ref 82–98)
MONOCYTES # BLD AUTO: 0.67 THOUSAND/ΜL (ref 0.17–1.22)
MONOCYTES NFR BLD AUTO: 9 % (ref 4–12)
NEUTROPHILS # BLD AUTO: 4.95 THOUSANDS/ΜL (ref 1.85–7.62)
NEUTS SEG NFR BLD AUTO: 64 % (ref 43–75)
NONHDLC SERPL-MCNC: 96 MG/DL
NRBC BLD AUTO-RTO: 0 /100 WBCS
PLATELET # BLD AUTO: 227 THOUSANDS/UL (ref 149–390)
PMV BLD AUTO: 13.9 FL (ref 8.9–12.7)
POTASSIUM SERPL-SCNC: 3.9 MMOL/L (ref 3.6–5)
PROT SERPL-MCNC: 7.7 G/DL (ref 5.9–8.4)
RBC # BLD AUTO: 5.05 MILLION/UL (ref 3.81–5.12)
SODIUM SERPL-SCNC: 142 MMOL/L (ref 137–147)
TRIGL SERPL-MCNC: 81 MG/DL
TSH SERPL DL<=0.05 MIU/L-ACNC: 0.67 UIU/ML (ref 0.45–4.5)
WBC # BLD AUTO: 7.72 THOUSAND/UL (ref 4.31–10.16)

## 2022-05-11 PROCEDURE — 84443 ASSAY THYROID STIM HORMONE: CPT

## 2022-05-11 PROCEDURE — 80053 COMPREHEN METABOLIC PANEL: CPT

## 2022-05-11 PROCEDURE — 36415 COLL VENOUS BLD VENIPUNCTURE: CPT

## 2022-05-11 PROCEDURE — 87389 HIV-1 AG W/HIV-1&-2 AB AG IA: CPT

## 2022-05-11 PROCEDURE — 83036 HEMOGLOBIN GLYCOSYLATED A1C: CPT

## 2022-05-11 PROCEDURE — 80061 LIPID PANEL: CPT

## 2022-05-11 PROCEDURE — 85025 COMPLETE CBC W/AUTO DIFF WBC: CPT

## 2022-05-11 PROCEDURE — 86803 HEPATITIS C AB TEST: CPT

## 2022-05-12 LAB
EST. AVERAGE GLUCOSE BLD GHB EST-MCNC: 154 MG/DL
HBA1C MFR BLD: 7 %
HIV 1+2 AB+HIV1 P24 AG SERPL QL IA: NORMAL

## 2022-05-23 ENCOUNTER — HOSPITAL ENCOUNTER (OUTPATIENT)
Dept: RADIOLOGY | Facility: HOSPITAL | Age: 42
Discharge: HOME | End: 2022-05-23
Payer: COMMERCIAL

## 2022-05-23 DIAGNOSIS — R13.10 DYSPHAGIA, UNSPECIFIED TYPE: ICD-10-CM

## 2022-05-23 PROCEDURE — 74220 X-RAY XM ESOPHAGUS 1CNTRST: CPT

## 2022-06-02 ENCOUNTER — TELEPHONE (OUTPATIENT)
Dept: FAMILY MEDICINE CLINIC | Facility: CLINIC | Age: 42
End: 2022-06-02

## 2022-07-01 ENCOUNTER — HOSPITAL ENCOUNTER (OUTPATIENT)
Dept: MAMMOGRAPHY | Facility: CLINIC | Age: 42
Discharge: HOME | End: 2022-07-01
Payer: COMMERCIAL

## 2022-07-01 VITALS — HEIGHT: 62 IN | BODY MASS INDEX: 50.24 KG/M2 | WEIGHT: 273 LBS

## 2022-07-01 DIAGNOSIS — Z12.31 BREAST CANCER SCREENING BY MAMMOGRAM: ICD-10-CM

## 2022-07-01 PROCEDURE — 77063 BREAST TOMOSYNTHESIS BI: CPT

## 2022-07-01 PROCEDURE — 77067 SCR MAMMO BI INCL CAD: CPT

## 2022-07-20 ENCOUNTER — OFFICE VISIT (OUTPATIENT)
Dept: FAMILY MEDICINE CLINIC | Facility: CLINIC | Age: 42
End: 2022-07-20

## 2022-07-20 VITALS
DIASTOLIC BLOOD PRESSURE: 92 MMHG | HEART RATE: 89 BPM | BODY MASS INDEX: 45.05 KG/M2 | HEIGHT: 62 IN | RESPIRATION RATE: 18 BRPM | TEMPERATURE: 97.1 F | WEIGHT: 244.8 LBS | OXYGEN SATURATION: 98 % | SYSTOLIC BLOOD PRESSURE: 146 MMHG

## 2022-07-20 DIAGNOSIS — R79.89 ELEVATED LFTS: ICD-10-CM

## 2022-07-20 DIAGNOSIS — K21.9 GASTROESOPHAGEAL REFLUX DISEASE, UNSPECIFIED WHETHER ESOPHAGITIS PRESENT: Primary | ICD-10-CM

## 2022-07-20 DIAGNOSIS — E11.9 TYPE 2 DIABETES MELLITUS WITHOUT COMPLICATION, WITHOUT LONG-TERM CURRENT USE OF INSULIN (HCC): ICD-10-CM

## 2022-07-20 DIAGNOSIS — R13.10 DYSPHAGIA, UNSPECIFIED TYPE: ICD-10-CM

## 2022-07-20 DIAGNOSIS — H52.00 HYPERMETROPIA, UNSPECIFIED LATERALITY: ICD-10-CM

## 2022-07-20 DIAGNOSIS — M54.9 MID BACK PAIN ON RIGHT SIDE: ICD-10-CM

## 2022-07-20 DIAGNOSIS — E66.01 CLASS 3 SEVERE OBESITY DUE TO EXCESS CALORIES WITHOUT SERIOUS COMORBIDITY WITH BODY MASS INDEX (BMI) OF 50.0 TO 59.9 IN ADULT (HCC): ICD-10-CM

## 2022-07-20 PROCEDURE — 99215 OFFICE O/P EST HI 40 MIN: CPT | Performed by: PHYSICIAN ASSISTANT

## 2022-07-20 RX ORDER — NAPROXEN 500 MG/1
500 TABLET ORAL 2 TIMES DAILY PRN
Qty: 30 TABLET | Refills: 1 | Status: SHIPPED | OUTPATIENT
Start: 2022-07-20

## 2022-07-20 RX ORDER — CYCLOBENZAPRINE HCL 10 MG
10 TABLET ORAL 2 TIMES DAILY PRN
Qty: 30 TABLET | Refills: 1 | Status: SHIPPED | OUTPATIENT
Start: 2022-07-20

## 2022-07-20 RX ORDER — OMEPRAZOLE 20 MG/1
20 CAPSULE, DELAYED RELEASE ORAL DAILY
Qty: 90 CAPSULE | Refills: 1 | Status: SHIPPED | OUTPATIENT
Start: 2022-07-20 | End: 2022-09-13

## 2022-07-20 NOTE — PROGRESS NOTES
Assessment/Plan:    Gastroesophageal reflux disease  - Patient had tried taking her 's omeprazole and it was effective  - Will prescribe omeprazole 20 mg daily   - Reviewed the causes of heartburn  Discussed importance of diet and lifestyle modifications to control GERD symptoms  Avoid things which worsen heartburn (Ex: Caffiene, tomato based products, spicy foods, tobacco, alcohol, obesity, tight-fitting clothing ) Discussed importance of eating small frequent meals instead of large meals  While laying down/sleeping instructed to refrain from laying flat and instead, prop pillows up behind their back  Instructed to not lay down 2-3 hours following a meal      Dysphagia  - Reviewed barium swallow study (5/23/22)  Results revealed moderate sliding hiatal hernia and gastroesophageal reflux   - Symptoms have been persistent  Will refer to Gastroenterology for further evaluation and management  Mid back pain on right side  - Will prescribe Flexeril 10 mg, to be taken twice daily as needed  - Will prescribe naproxen 500 mg, to be taken twice daily as needed  - Advised to apply ice/heating pad as needed to affected area  - Provided patient with list of exercises to perform daily   - If symptoms persist/worsen, would recommend comprehensive spine program     Elevated LFTs  - Reviewed recent CMP with patient  LFTs are elevated  Patient does have family history of her brother recently being diagnosed with liver cancer   - Patient is requesting referral to Gastroenterology  Referral placed today  Type 2 diabetes mellitus without complication, without long-term current use of insulin ([facility] [address] )  - Reviewed recent hemoglobin A1c which is 7 0    This is a new diagnosis of diabetes for patient   - Recommended to start metformin, but patient would like to start working on lifestyle modifications first   - Of note, since patient had blood work completed, she has changed her diet drastically and has lost about 30 lb   - Recommended starting to follow diabetic diet with focus on low intake of carbohydrates and sugars  - Will recheck hemoglobin A1c in 3 months  If not improved, would recommend starting metformin  Lab Results   Component Value Date    HGBA1C 7 0 (H) 05/11/2022       Class 3 severe obesity due to excess calories without serious comorbidity with body mass index (BMI) of 50 0 to 59 9 in [facility])  - Patient has lost about 30 lb since last visit less than 1 month ago  Congratulate patient on her efforts and encouraged to keep up the great work  Wt Readings from Last 3 Encounters:   07/20/22 111 kg (244 lb 12 8 oz)   07/01/22 124 kg (273 lb)   03/24/22 124 kg (273 lb 9 6 oz)           Diagnoses and all orders for this visit:    Gastroesophageal reflux disease, unspecified whether esophagitis present  -     omeprazole (PriLOSEC) 20 mg delayed release capsule; Take 1 capsule (20 mg total) by mouth daily  -     Ambulatory Referral to Gastroenterology; Future    Mid back pain on right side  -     cyclobenzaprine (FLEXERIL) 10 mg tablet; Take 1 tablet (10 mg total) by mouth 2 (two) times a day as needed for muscle spasms  -     naproxen (Naprosyn) 500 mg tablet; Take 1 tablet (500 mg total) by mouth 2 (two) times a day as needed for mild pain    Hypermetropia, unspecified laterality  -     Ambulatory Referral to Ophthalmology; Future    Dysphagia, unspecified type  -     Ambulatory Referral to Gastroenterology; Future    Elevated LFTs  -     Ambulatory Referral to Gastroenterology; Future    Type 2 diabetes mellitus without complication, without long-term current use of insulin (HCC)    Class 3 severe obesity due to excess calories without serious comorbidity with body mass index (BMI) of 50 0 to 59 9 in [facility])          All of patients questions were answered  Patient understands and agrees with the above plan       Return in about 2 months (around 9/20/2022) for Next scheduled follow up T2DM, dysphagia  Demetrius Weathers PA-C  07/20/22  [address] [place]          Subjective:     Patient ID: Cari Benz  is a 36 y o  female with known PHM of dysphagia, GERD who presents today in office for dysphagia follow-up  Patient is accompanied today by her , Anuja Olvera, who helps with translation from [facility] [place] to [place]  - Patient is a 36 y o  female who presents today for dysphagia follow-up  Patient notes she had an episode on Sunday where she was choking on a small piece of meat and bread  Patient notes she drinks some water which helped, but was still experiencing some discomfort of her upper chest   Patient notes she has tried taking omeprazole from her  and that is helpful when she has strong epigastric pain  - Patient notes she has been experiencing left mid back pain for years, but recently it has been worsening  Patient notes she does have history of falling down the stairs, the last time being about 1 year ago  Patient notes she has been taking Tylenol with some relief and using a heating pad  Patient notes pain is worse with movement  Patient rates the pain as 6-7/10, but sometimes could increase up to 10/10     - Patient notes she has drastically changed her diet around by eating healthier  Patient denies any additional exercise other than daily walking  Patient notes she has already lost about 30 lb which she is very excited about  The following portions of the patient's history were reviewed and updated as appropriate: allergies, current medications, past family history, past medical history, past social history, past surgical history and problem list         Review of Systems   Constitutional: Negative for chills and fever  HENT: Positive for trouble swallowing  Negative for congestion and sore throat  Eyes: Negative for pain  Respiratory: Negative for cough, chest tightness and shortness of breath      Cardiovascular: Negative for chest pain, palpitations and leg swelling  Gastrointestinal: Positive for abdominal pain  Negative for constipation, diarrhea, nausea and vomiting  Gastric reflux   Genitourinary: Negative for difficulty urinating and dysuria  Musculoskeletal: Positive for back pain  Negative for arthralgias  Skin: Negative for rash  Neurological: Negative for dizziness and headaches  Psychiatric/Behavioral: The patient is not nervous/anxious  Objective:   Vitals:    07/20/22 1318   BP: 146/92   BP Location: Left arm   Patient Position: Sitting   Cuff Size: Standard   Pulse: 89   Resp: 18   Temp: (!) 97 1 °F (36 2 °C)   TempSrc: Temporal   SpO2: 98%   Weight: 111 kg (244 lb 12 8 oz)   Height: 5' 2" (1 575 m)         Physical Exam  Vitals and nursing note reviewed  Constitutional:       General: She is not in acute distress  Appearance: She is well-developed  HENT:      Head: Normocephalic and atraumatic  Right Ear: External ear normal       Left Ear: External ear normal       Nose: Nose normal    Eyes:      Conjunctiva/sclera: Conjunctivae normal    Cardiovascular:      Rate and Rhythm: Normal rate and regular rhythm  Pulses: Normal pulses  Heart sounds: Normal heart sounds  Pulmonary:      Effort: Pulmonary effort is normal  No respiratory distress  Breath sounds: Normal breath sounds  No wheezing  Abdominal:      General: Bowel sounds are normal       Palpations: Abdomen is soft  Tenderness: There is no abdominal tenderness  Musculoskeletal:      Cervical back: Normal range of motion and neck supple  Thoracic back: Tenderness (Left parathoracic area) present  No swelling  Normal range of motion  Skin:     General: Skin is warm and dry  Neurological:      Mental Status: She is alert and oriented to person, place, and time     Psychiatric:         Behavior: Behavior normal

## 2022-07-24 PROBLEM — E11.9 TYPE 2 DIABETES MELLITUS WITHOUT COMPLICATION, WITHOUT LONG-TERM CURRENT USE OF INSULIN (HCC): Status: ACTIVE | Noted: 2022-07-24

## 2022-07-24 PROBLEM — M54.9 MID BACK PAIN ON RIGHT SIDE: Status: ACTIVE | Noted: 2022-07-24

## 2022-07-24 PROBLEM — R79.89 ELEVATED LFTS: Status: ACTIVE | Noted: 2022-07-24

## 2022-07-25 NOTE — ASSESSMENT & PLAN NOTE
- Patient had tried taking her 's omeprazole and it was effective  - Will prescribe omeprazole 20 mg daily   - Reviewed the causes of heartburn  Discussed importance of diet and lifestyle modifications to control GERD symptoms  Avoid things which worsen heartburn (Ex: Caffiene, tomato based products, spicy foods, tobacco, alcohol, obesity, tight-fitting clothing ) Discussed importance of eating small frequent meals instead of large meals  While laying down/sleeping instructed to refrain from laying flat and instead, prop pillows up behind their back   Instructed to not lay down 2-3 hours following a meal

## 2022-07-25 NOTE — ASSESSMENT & PLAN NOTE
- Reviewed barium swallow study (5/23/22)  Results revealed moderate sliding hiatal hernia and gastroesophageal reflux   - Symptoms have been persistent  Will refer to Gastroenterology for further evaluation and management

## 2022-07-25 NOTE — ASSESSMENT & PLAN NOTE
- Reviewed recent hemoglobin A1c which is 7 0  This is a new diagnosis of diabetes for patient   - Recommended to start metformin, but patient would like to start working on lifestyle modifications first   - Of note, since patient had blood work completed, she has changed her diet drastically and has lost about 30 lb  - Recommended starting to follow diabetic diet with focus on low intake of carbohydrates and sugars  - Will recheck hemoglobin A1c in 3 months  If not improved, would recommend starting metformin      Lab Results   Component Value Date    HGBA1C 7 0 (H) 05/11/2022

## 2022-07-25 NOTE — ASSESSMENT & PLAN NOTE
- Will prescribe Flexeril 10 mg, to be taken twice daily as needed  - Will prescribe naproxen 500 mg, to be taken twice daily as needed  - Advised to apply ice/heating pad as needed to affected area    - Provided patient with list of exercises to perform daily   - If symptoms persist/worsen, would recommend comprehensive spine program

## 2022-07-25 NOTE — ASSESSMENT & PLAN NOTE
- Patient has lost about 30 lb since last visit less than 1 month ago  Congratulate patient on her efforts and encouraged to keep up the great work      Wt Readings from Last 3 Encounters:   07/20/22 111 kg (244 lb 12 8 oz)   07/01/22 124 kg (273 lb)   03/24/22 124 kg (273 lb 9 6 oz)

## 2022-07-25 NOTE — ASSESSMENT & PLAN NOTE
- Reviewed recent CMP with patient  LFTs are elevated  Patient does have family history of her brother recently being diagnosed with liver cancer   - Patient is requesting referral to Gastroenterology  Referral placed today

## 2022-08-08 LAB
LEFT EYE DIABETIC RETINOPATHY: NORMAL
RIGHT EYE DIABETIC RETINOPATHY: NORMAL

## 2022-09-13 ENCOUNTER — CONSULT (OUTPATIENT)
Dept: GASTROENTEROLOGY | Facility: MEDICAL CENTER | Age: 42
End: 2022-09-13
Payer: COMMERCIAL

## 2022-09-13 VITALS
BODY MASS INDEX: 43.24 KG/M2 | WEIGHT: 235 LBS | OXYGEN SATURATION: 99 % | HEART RATE: 85 BPM | HEIGHT: 62 IN | DIASTOLIC BLOOD PRESSURE: 78 MMHG | SYSTOLIC BLOOD PRESSURE: 126 MMHG

## 2022-09-13 DIAGNOSIS — R13.10 DYSPHAGIA, UNSPECIFIED TYPE: ICD-10-CM

## 2022-09-13 DIAGNOSIS — K21.9 GASTROESOPHAGEAL REFLUX DISEASE, UNSPECIFIED WHETHER ESOPHAGITIS PRESENT: Primary | ICD-10-CM

## 2022-09-13 DIAGNOSIS — R79.89 ELEVATED LFTS: ICD-10-CM

## 2022-09-13 DIAGNOSIS — K52.9 CHRONIC DIARRHEA: ICD-10-CM

## 2022-09-13 PROCEDURE — 3078F DIAST BP <80 MM HG: CPT | Performed by: INTERNAL MEDICINE

## 2022-09-13 PROCEDURE — 99244 OFF/OP CNSLTJ NEW/EST MOD 40: CPT | Performed by: INTERNAL MEDICINE

## 2022-09-13 PROCEDURE — 3074F SYST BP LT 130 MM HG: CPT | Performed by: INTERNAL MEDICINE

## 2022-09-13 RX ORDER — OMEPRAZOLE 40 MG/1
40 CAPSULE, DELAYED RELEASE ORAL DAILY
Qty: 30 CAPSULE | Refills: 3 | Status: SHIPPED | OUTPATIENT
Start: 2022-09-13 | End: 2022-10-12

## 2022-09-13 RX ORDER — SODIUM CHLORIDE 9 MG/ML
125 INJECTION, SOLUTION INTRAVENOUS CONTINUOUS
Status: CANCELLED | OUTPATIENT
Start: 2022-09-13

## 2022-09-13 NOTE — PROGRESS NOTES
[facility] - Outpatient Consultation  Fifi Burroughs 36 y o  female MRN: [id number]  Encounter: [id number]          ASSESSMENT AND PLAN:  69-year-old female with no significant past medical history who presents for evaluation  1  Gastroesophageal reflux disease, unspecified whether esophagitis present  2  Dysphagia, unspecified type  She reports chronic history of GERD and ongoing dysphagia particularly to solid foods  I discussed dietary/lifestyle anti-reflux measures with her today  Etiologies of her dysphagia include mechanical obstruction related to stricture, esophagitis versus motility disorder  Her barium swallow did show a hiatal hernia and evidence of gastroesophageal reflux  I recommended she increase omeprazole to 40 mg daily  EGD will be performed for evaluation of her dysphagia symptoms obtain esophageal, gastric biopsies and evaluate her hiatal hernia  If she has no improvement on PPI and a negative EGD she may require manometry testing in the future    - EGD; Future  - omeprazole (PriLOSEC) 40 MG capsule; Take 1 capsule (40 mg total) by mouth daily  Dispense: 30 capsule; Refill: 3    3  Chronic diarrhea  She has history of chronic intermittent diarrhea  Etiologies include infectious, inflammatory or malabsorptive causes  I have ordered stool and serologic testing for further evaluation  She may use Imodium as needed in the interim  She has no indication for colonoscopy at this time however if symptoms persist with a negative workup she may require this in the future  - Calprotectin,Fecal; Future  - Giardia antigen; Future    4  Elevated LFTs  She has history of mildly elevated LFTs in May of this year in a hepatocellular pattern  Etiologies include nonalcoholic fatty liver disease given her elevated BMI  I have ordered additional testing to exclude causes of viral, genetic/metabolic, autoimmune causes    Right upper quadrant ultrasound will be performed for evaluation as well  She has no chronic stigmata of liver disease on exam           - Antimitochondrial antibody; Future  - Anti-smooth muscle antibody, IgG; Future  - IgG 1, 2, 3, and 4; Future  - IgG, IgA, IgM; Future  - Liver/Kidney Microsomal Antibody; Future  - Tissue transglutaminase, IgA; Future  - Iron Panel (Includes Ferritin, Iron Sat%, Iron, and TIBC); Future  - Alpha-1-antitrypsin; Future  - Ceruloplasmin; Future  - Chronic Hepatitis Panel; Future  - US right upper quadrant; Future      ______________________________________________________________________    HPI:  30-year-old female with no significant past medical history who presents for evaluation  Patient is primarily [address] speaking and the  services are utilized for this visit     She reports episodes of dysphagia particularly to solid foods like breads and meats  This is been occurring for some time  She also has symptoms of gastroesophageal reflux with chest burning which can radiate to her throat  She was started on omeprazole 20 mg daily few months ago and has had improvement of the symptoms  She has no nausea or vomiting  She denies abdominal pain  She has intermittent bloating  She reports regular, formed bowel movements usually however has had intermittent episodes of loose stools after eating  There are sometimes food triggers  She denies rectal bleeding     She reports no prior EGD or colonoscopy   She has no family history of gastrointestinal disease including colorectal cancer  She has no prior GI surgical history  She takes no anti-platelet or anticoagulant medications     May 2022 barium swallow showed a hiatal hernia and gastroesophageal reflux  May 2022 liver enzymes show AST 80 ALT 76 total bilirubin 1 3          REVIEW OF SYSTEMS:    CONSTITUTIONAL: Denies any fever, chills, rigors, and weight loss  HEENT: No earache or tinnitus  Denies hearing loss or visual disturbances    CARDIOVASCULAR: No chest pain or palpitations  RESPIRATORY: Denies any cough, hemoptysis, shortness of breath or dyspnea on exertion  GASTROINTESTINAL: As noted in the History of Present Illness  GENITOURINARY: No problems with urination  Denies any hematuria or dysuria  NEUROLOGIC: No dizziness or vertigo, denies headaches  MUSCULOSKELETAL: Denies any muscle or joint pain  SKIN: Denies skin rashes or itching  ENDOCRINE: Denies excessive thirst  Denies intolerance to heat or cold  PSYCHOSOCIAL: Denies depression or anxiety  Denies any recent memory loss  Historical Information   Past Medical History:   Diagnosis Date    GERD (gastroesophageal reflux disease)      Past Surgical History:   Procedure Laterality Date    TUBAL LIGATION       Social History   Social History     Substance and Sexual Activity   Alcohol Use Never     Social History     Substance and Sexual Activity   Drug Use Never     Social History     Tobacco Use   Smoking Status Never Smoker   Smokeless Tobacco Never Used     Family History   Problem Relation Age of Onset    No Known Problems Mother     Hypertension Father     Diabetes Father     Liver cancer Brother 40    Liver cancer Brother     Breast cancer Maternal Grandfather     Stomach cancer Paternal Uncle        Meds/Allergies       Current Outpatient Medications:     cyclobenzaprine (FLEXERIL) 10 mg tablet    naproxen (Naprosyn) 500 mg tablet    omeprazole (PriLOSEC) 20 mg delayed release capsule    No Known Allergies        Objective     Blood pressure 126/78, pulse 85, height 5' 2" (1 575 m), weight 107 kg (235 lb), SpO2 99 %  Body mass index is 42 98 kg/m²  PHYSICAL EXAM:      General Appearance:   Alert, cooperative, no distress   HEENT:   Normocephalic, atraumatic, anicteric       Neck:  Supple, symmetrical, trachea midline   Lungs:   Clear to auscultation bilaterally; no rales, rhonchi or wheezing; respirations unlabored    Heart[de-identified]   Regular rate and rhythm; no murmur, rub, or gallop  Abdomen:   Soft, non-tender, non-distended; normal bowel sounds; no masses, no organomegaly    Genitalia:   Deferred    Rectal:   Deferred    Extremities:  No cyanosis, clubbing or edema    Pulses:  2+ and symmetric    Skin:  No jaundice, rashes, or lesions    Lymph nodes:  No palpable cervical lymphadenopathy        Lab Results:   No visits with results within 1 Day(s) from this visit     Latest known visit with results is:   Appointment on 05/11/2022   Component Date Value    WBC 05/11/2022 7 72     RBC 05/11/2022 5 05     Hemoglobin 05/11/2022 13 7     Hematocrit 05/11/2022 44 2     MCV 05/11/2022 88     MCH 05/11/2022 27 1     MCHC 05/11/2022 31 0 (A)    RDW 05/11/2022 12 9     MPV 05/11/2022 13 9 (A)    Platelets 86/01/4227 227     nRBC 05/11/2022 0     Neutrophils Relative 05/11/2022 64     Immat GRANS % 05/11/2022 0     Lymphocytes Relative 05/11/2022 26     Monocytes Relative 05/11/2022 9     Eosinophils Relative 05/11/2022 1     Basophils Relative 05/11/2022 0     Neutrophils Absolute 05/11/2022 4 95     Immature Grans Absolute 05/11/2022 0 02     Lymphocytes Absolute 05/11/2022 1 99     Monocytes Absolute 05/11/2022 0 67     Eosinophils Absolute 05/11/2022 0 06     Basophils Absolute 05/11/2022 0 03     Sodium 05/11/2022 142     Potassium 05/11/2022 3 9     Chloride 05/11/2022 105     CO2 05/11/2022 29     ANION GAP 05/11/2022 8     BUN 05/11/2022 6     Creatinine 05/11/2022 0 67     Glucose, Fasting 05/11/2022 138 (A)    Calcium 05/11/2022 8 9     AST 05/11/2022 80 (A)    ALT 05/11/2022 76 (A)    Alkaline Phosphatase 05/11/2022 85     Total Protein 05/11/2022 7 7     Albumin 05/11/2022 4 0     Total Bilirubin 05/11/2022 1 37 (A)    eGFR 05/11/2022 108     Cholesterol 05/11/2022 119     Triglycerides 05/11/2022 81     HDL, Direct 05/11/2022 23 (A)    LDL Calculated 05/11/2022 80     Non-HDL-Chol (CHOL-HDL) 05/11/2022 96     TSH 3RD CHARLES 05/11/2022 0 671     Hemoglobin A1C 05/11/2022 7 0 (A)    EAG 05/11/2022 154     Hepatitis C Ab 05/11/2022 Non-reactive     HIV-1/HIV-2 Ab 05/11/2022 Non-Reactive          Radiology Results:   No results found

## 2022-09-20 ENCOUNTER — TELEPHONE (OUTPATIENT)
Dept: GASTROENTEROLOGY | Facility: CLINIC | Age: 42
End: 2022-09-20

## 2022-10-03 RX ORDER — SODIUM CHLORIDE, SODIUM LACTATE, POTASSIUM CHLORIDE, CALCIUM CHLORIDE 600; 310; 30; 20 MG/100ML; MG/100ML; MG/100ML; MG/100ML
125 INJECTION, SOLUTION INTRAVENOUS CONTINUOUS
Status: CANCELLED | OUTPATIENT
Start: 2022-10-03

## 2022-10-03 RX ORDER — LIDOCAINE HYDROCHLORIDE 10 MG/ML
0.5 INJECTION, SOLUTION EPIDURAL; INFILTRATION; INTRACAUDAL; PERINEURAL ONCE AS NEEDED
Status: CANCELLED | OUTPATIENT
Start: 2022-10-03

## 2022-10-04 ENCOUNTER — ANESTHESIA EVENT (OUTPATIENT)
Dept: GASTROENTEROLOGY | Facility: HOSPITAL | Age: 42
End: 2022-10-04

## 2022-10-04 ENCOUNTER — HOSPITAL ENCOUNTER (OUTPATIENT)
Dept: GASTROENTEROLOGY | Facility: HOSPITAL | Age: 42
Setting detail: OUTPATIENT SURGERY
Discharge: HOME | End: 2022-10-04
Attending: INTERNAL MEDICINE | Admitting: INTERNAL MEDICINE
Payer: COMMERCIAL

## 2022-10-04 ENCOUNTER — ANESTHESIA (OUTPATIENT)
Dept: GASTROENTEROLOGY | Facility: HOSPITAL | Age: 42
End: 2022-10-04

## 2022-10-04 VITALS
TEMPERATURE: 98.1 F | OXYGEN SATURATION: 91 % | SYSTOLIC BLOOD PRESSURE: 124 MMHG | HEART RATE: 83 BPM | RESPIRATION RATE: 18 BRPM | DIASTOLIC BLOOD PRESSURE: 73 MMHG

## 2022-10-04 DIAGNOSIS — K21.9 GASTROESOPHAGEAL REFLUX DISEASE, UNSPECIFIED WHETHER ESOPHAGITIS PRESENT: ICD-10-CM

## 2022-10-04 DIAGNOSIS — R13.10 DYSPHAGIA, UNSPECIFIED TYPE: ICD-10-CM

## 2022-10-04 LAB
EXT PREGNANCY TEST URINE: NEGATIVE
EXT. CONTROL: NORMAL

## 2022-10-04 PROCEDURE — C1726 CATH, BAL DIL, NON-VASCULAR: HCPCS

## 2022-10-04 PROCEDURE — 43249 ESOPH EGD DILATION <30 MM: CPT | Performed by: INTERNAL MEDICINE

## 2022-10-04 PROCEDURE — 88305 TISSUE EXAM BY PATHOLOGIST: CPT | Performed by: SPECIALIST

## 2022-10-04 PROCEDURE — 43239 EGD BIOPSY SINGLE/MULTIPLE: CPT | Performed by: INTERNAL MEDICINE

## 2022-10-04 PROCEDURE — 81025 URINE PREGNANCY TEST: CPT | Performed by: STUDENT IN AN ORGANIZED HEALTH CARE EDUCATION/TRAINING PROGRAM

## 2022-10-04 RX ORDER — SODIUM CHLORIDE 9 MG/ML
125 INJECTION, SOLUTION INTRAVENOUS CONTINUOUS
Status: DISCONTINUED | OUTPATIENT
Start: 2022-10-04 | End: 2022-10-08 | Stop reason: HOSPADM

## 2022-10-04 RX ORDER — PROPOFOL 10 MG/ML
INJECTION, EMULSION INTRAVENOUS AS NEEDED
Status: DISCONTINUED | OUTPATIENT
Start: 2022-10-04 | End: 2022-10-04

## 2022-10-04 RX ORDER — SODIUM CHLORIDE, SODIUM LACTATE, POTASSIUM CHLORIDE, CALCIUM CHLORIDE 600; 310; 30; 20 MG/100ML; MG/100ML; MG/100ML; MG/100ML
100 INJECTION, SOLUTION INTRAVENOUS CONTINUOUS
Status: CANCELLED | OUTPATIENT
Start: 2022-10-04

## 2022-10-04 RX ORDER — LIDOCAINE HYDROCHLORIDE 10 MG/ML
0.5 INJECTION, SOLUTION EPIDURAL; INFILTRATION; INTRACAUDAL; PERINEURAL ONCE AS NEEDED
Status: DISCONTINUED | OUTPATIENT
Start: 2022-10-04 | End: 2022-10-08 | Stop reason: HOSPADM

## 2022-10-04 RX ORDER — SODIUM CHLORIDE, SODIUM LACTATE, POTASSIUM CHLORIDE, CALCIUM CHLORIDE 600; 310; 30; 20 MG/100ML; MG/100ML; MG/100ML; MG/100ML
INJECTION, SOLUTION INTRAVENOUS CONTINUOUS PRN
Status: DISCONTINUED | OUTPATIENT
Start: 2022-10-04 | End: 2022-10-04

## 2022-10-04 RX ADMIN — SODIUM CHLORIDE 125 ML/HR: 0.9 INJECTION, SOLUTION INTRAVENOUS at 12:48

## 2022-10-04 RX ADMIN — PROPOFOL 100 MG: 10 INJECTION, EMULSION INTRAVENOUS at 13:50

## 2022-10-04 RX ADMIN — PROPOFOL 50 MG: 10 INJECTION, EMULSION INTRAVENOUS at 13:54

## 2022-10-04 RX ADMIN — PROPOFOL 100 MG: 10 INJECTION, EMULSION INTRAVENOUS at 13:46

## 2022-10-04 RX ADMIN — SODIUM CHLORIDE, SODIUM LACTATE, POTASSIUM CHLORIDE, AND CALCIUM CHLORIDE: .6; .31; .03; .02 INJECTION, SOLUTION INTRAVENOUS at 12:47

## 2022-10-04 RX ADMIN — PROPOFOL 100 MG: 10 INJECTION, EMULSION INTRAVENOUS at 13:45

## 2022-10-04 RX ADMIN — PROPOFOL 50 MG: 10 INJECTION, EMULSION INTRAVENOUS at 14:04

## 2022-10-04 RX ADMIN — PROPOFOL 50 MG: 10 INJECTION, EMULSION INTRAVENOUS at 14:01

## 2022-10-04 RX ADMIN — PROPOFOL 50 MG: 10 INJECTION, EMULSION INTRAVENOUS at 13:58

## 2022-10-04 NOTE — NURSING NOTE
Pt is awake,alert,tolerated diet, seen and instructed by Dr Argueta [place]  Pt verbalizes an [place] interpreted  Pt offers no questions or complaints  Written and verbal instructions given

## 2022-10-04 NOTE — H&P
History and Physical - [facility]  Nilda Gutierrez 36 y o  female MRN: [id number]                  HPI: Nilda Gutierrez is a 36y o  year old female who presents for EGD to investigate dysphagia, chronic diarrhea and for [place]'s screening  REVIEW OF SYSTEMS: Per the HPI, and otherwise unremarkable  Historical Information   Past Medical History:   Diagnosis Date    GERD (gastroesophageal reflux disease)      Past Surgical History:   Procedure Laterality Date    TUBAL LIGATION       Social History   Social History     Substance and Sexual Activity   Alcohol Use Never     Social History     Substance and Sexual Activity   Drug Use Never     Social History     Tobacco Use   Smoking Status Never Smoker   Smokeless Tobacco Never Used     Family History   Problem Relation Age of Onset    No Known Problems Mother     Hypertension Father     Diabetes Father     Liver cancer Brother 40    Liver cancer Brother     Breast cancer Maternal Grandfather     Stomach cancer Paternal Uncle        Meds/Allergies     (Not in a hospital admission)      No Known Allergies    Objective     Blood pressure 146/81, pulse 87, temperature (!) 97 2 °F (36 2 °C), temperature source Temporal, resp  rate 20, SpO2 96 %  PHYSICAL EXAM    Gen: NAD  CV: RRR  CHEST: Clear  ABD: soft, NT/ND  EXT: no edema      ASSESSMENT/PLAN:  Nilda Gutierrez is a 36y o  year old female who presents for EGD to investigate dysphagia, chronic diarrhea and for [place]'s screening  The patient is stable and optimized for the procedure, we reviewed risk and benefits  Risk include but not limited to infection, bleeding, perforation and missing a lesion

## 2022-10-04 NOTE — ANESTHESIA PREPROCEDURE EVALUATION
Procedure:  EGD    Relevant Problems   CARDIO   (+) Mid back pain on right side      ENDO   (+) Type 2 diabetes mellitus without complication, without long-term current use of insulin (HCC)      GI/HEPATIC   (+) Dysphagia   (+) Gastroesophageal reflux disease      MUSCULOSKELETAL   (+) Mid back pain on right side        Physical Exam    Airway    Mallampati score: III  TM Distance: >3 FB  Neck ROM: full     Dental   No notable dental hx     Cardiovascular  Rhythm: regular, Rate: normal, Cardiovascular exam normal    Pulmonary  Pulmonary exam normal Breath sounds clear to auscultation,     Other Findings        Anesthesia Plan  ASA Score- 3     Anesthesia Type- IV sedation with anesthesia with ASA Monitors  Additional Monitors:   Airway Plan:     Comment: Discussed risks/benefits, including medication reactions, awareness, aspiration, and serious/life threatening complications  Plan to maintain native airway with IVGA, monitored with EtCO2  Plan Factors-Exercise tolerance (METS): >4 METS  Patient summary reviewed  Patient instructed to abstain from smoking on day of procedure  Patient did not smoke on day of surgery  Induction- intravenous  Postoperative Plan-     Informed Consent- Anesthetic plan and risks discussed with patient  I personally reviewed this patient with the CRNA  Discussed and agreed on the Anesthesia Plan with the REMINGTON Adams

## 2022-10-11 DIAGNOSIS — A04.8 H. PYLORI INFECTION: Primary | ICD-10-CM

## 2022-10-11 PROCEDURE — 88305 TISSUE EXAM BY PATHOLOGIST: CPT | Performed by: SPECIALIST

## 2022-10-11 RX ORDER — PANTOPRAZOLE SODIUM 40 MG/1
40 TABLET, DELAYED RELEASE ORAL
Qty: 28 TABLET | Refills: 0 | Status: SHIPPED | OUTPATIENT
Start: 2022-10-11 | End: 2022-10-12

## 2022-10-11 RX ORDER — CLARITHROMYCIN 500 MG/1
500 TABLET, COATED ORAL EVERY 12 HOURS SCHEDULED
Qty: 28 TABLET | Refills: 0 | Status: SHIPPED | OUTPATIENT
Start: 2022-10-11 | End: 2022-10-12

## 2022-10-11 RX ORDER — AMOXICILLIN 500 MG/1
1000 CAPSULE ORAL EVERY 12 HOURS SCHEDULED
Qty: 56 CAPSULE | Refills: 0 | Status: SHIPPED | OUTPATIENT
Start: 2022-10-11 | End: 2022-10-12

## 2022-10-12 ENCOUNTER — TELEPHONE (OUTPATIENT)
Dept: GASTROENTEROLOGY | Facility: CLINIC | Age: 42
End: 2022-10-12

## 2022-10-12 DIAGNOSIS — A04.8 H. PYLORI INFECTION: Primary | ICD-10-CM

## 2022-10-12 RX ORDER — BISMUTH SUBSALICYLATE 262 MG/1
262 TABLET, CHEWABLE ORAL
Qty: 56 TABLET | Refills: 0 | Status: SHIPPED | OUTPATIENT
Start: 2022-10-12 | End: 2022-10-26

## 2022-10-12 RX ORDER — OMEPRAZOLE 40 MG/1
40 CAPSULE, DELAYED RELEASE ORAL 2 TIMES DAILY
Qty: 28 CAPSULE | Refills: 0 | Status: SHIPPED | OUTPATIENT
Start: 2022-10-12 | End: 2022-10-26

## 2022-10-12 RX ORDER — METRONIDAZOLE 250 MG/1
250 TABLET ORAL EVERY 6 HOURS SCHEDULED
Qty: 56 TABLET | Refills: 0 | Status: SHIPPED | OUTPATIENT
Start: 2022-10-12 | End: 2022-10-26

## 2022-10-12 RX ORDER — TETRACYCLINE HYDROCHLORIDE 500 MG/1
500 CAPSULE ORAL 4 TIMES DAILY
Qty: 56 CAPSULE | Refills: 0 | Status: SHIPPED | OUTPATIENT
Start: 2022-10-12 | End: 2022-10-26

## 2022-10-12 NOTE — RESULT ENCOUNTER NOTE
Dear staff:     Patient is Bel[place]ian-speaking  Please kindly communicate with patient that stomach biopsy showed infection of the stomach with H pylori bacteria  I have prescribed 2 antibiotics and high-dose pantoprazole  These medications need to be taken together over a period of 2 weeks for treatment of H pylori infection  Patient should stop omeprazole  Patient will need to take a stool test 1 month after completing the treatment course  This stool test is ordered today  The purpose of the stool test is to confirm that treatment for H pylori was successful  Continue to follow up in GI office and contact us with any questions or concerns

## 2022-10-12 NOTE — TELEPHONE ENCOUNTER
----- Message from Delisa Judd MD sent at 10/11/2022 10:21 PM EDT -----   Dear staff:     Patient is Mosotho-speaking  Please kindly communicate with patient that stomach biopsy showed infection of the stomach with H pylori bacteria  I have prescribed 2 antibiotics and high-dose pantoprazole  These medications need to be taken together over a period of 2 weeks for treatment of H pylori infection  Patient should stop omeprazole  Patient will need to take a stool test 1 month after completing the treatment course  This stool test is ordered today  The purpose of the stool test is to confirm that treatment for H pylori was successful  Continue to follow up in GI office and contact us with any questions or concerns

## 2022-10-12 NOTE — TELEPHONE ENCOUNTER
Spoke with pt via [facility]  and reviewed results  Also sent in medication regimen and reviewed that  Explained future stool testing to confirm eradication   Pt verbalized understanding    Called pharmacy to confirm

## 2022-12-12 DIAGNOSIS — M54.9 MID BACK PAIN ON RIGHT SIDE: ICD-10-CM

## 2022-12-13 RX ORDER — NAPROXEN 500 MG/1
500 TABLET ORAL 2 TIMES DAILY PRN
Qty: 30 TABLET | Refills: 1 | Status: SHIPPED | OUTPATIENT
Start: 2022-12-13

## 2023-01-13 DIAGNOSIS — A04.8 H. PYLORI INFECTION: ICD-10-CM

## 2023-01-13 RX ORDER — PANTOPRAZOLE SODIUM 40 MG/1
40 TABLET, DELAYED RELEASE ORAL
Qty: 28 TABLET | Refills: 0 | Status: SHIPPED | OUTPATIENT
Start: 2023-01-13 | End: 2023-01-27

## 2023-02-16 DIAGNOSIS — M54.9 MID BACK PAIN ON RIGHT SIDE: ICD-10-CM

## 2023-02-17 RX ORDER — CYCLOBENZAPRINE HCL 10 MG
10 TABLET ORAL 2 TIMES DAILY PRN
Qty: 30 TABLET | Refills: 1 | Status: SHIPPED | OUTPATIENT
Start: 2023-02-17

## 2023-02-21 DIAGNOSIS — A04.8 H. PYLORI INFECTION: ICD-10-CM

## 2023-02-21 RX ORDER — PANTOPRAZOLE SODIUM 40 MG/1
40 TABLET, DELAYED RELEASE ORAL
Qty: 28 TABLET | Refills: 0 | Status: SHIPPED | OUTPATIENT
Start: 2023-02-21 | End: 2023-03-07

## 2023-03-20 ENCOUNTER — TELEPHONE (OUTPATIENT)
Dept: FAMILY MEDICINE CLINIC | Facility: CLINIC | Age: 43
End: 2023-03-20

## 2023-03-20 NOTE — TELEPHONE ENCOUNTER
03/20/22    PCP SIGNATURE NEEDED FOR SURERSCRIPTS / Cyclobenzaprine  FORM RECEIVED VIA FAX AND PLACED IN PCP FOLDER TO BE DELIVERED AT ASSIGNED TIMES        Med: Cyclobenzaprine

## 2023-03-21 DIAGNOSIS — A04.8 H. PYLORI INFECTION: ICD-10-CM

## 2023-03-21 RX ORDER — PANTOPRAZOLE SODIUM 40 MG/1
40 TABLET, DELAYED RELEASE ORAL
Qty: 28 TABLET | Refills: 0 | Status: SHIPPED | OUTPATIENT
Start: 2023-03-21 | End: 2023-04-04

## 2023-04-26 DIAGNOSIS — A04.8 H. PYLORI INFECTION: ICD-10-CM

## 2023-04-26 RX ORDER — PANTOPRAZOLE SODIUM 40 MG/1
40 TABLET, DELAYED RELEASE ORAL
Qty: 28 TABLET | Refills: 0 | Status: SHIPPED | OUTPATIENT
Start: 2023-04-26 | End: 2023-05-10

## 2023-05-05 DIAGNOSIS — M54.9 MID BACK PAIN ON RIGHT SIDE: ICD-10-CM

## 2023-05-08 RX ORDER — NAPROXEN 500 MG/1
500 TABLET ORAL 2 TIMES DAILY PRN
Qty: 30 TABLET | Refills: 1 | Status: SHIPPED | OUTPATIENT
Start: 2023-05-08

## 2023-05-30 DIAGNOSIS — M54.9 MID BACK PAIN ON RIGHT SIDE: ICD-10-CM

## 2023-05-31 RX ORDER — NAPROXEN 500 MG/1
500 TABLET ORAL 2 TIMES DAILY PRN
Qty: 30 TABLET | Refills: 1 | Status: SHIPPED | OUTPATIENT
Start: 2023-05-31

## 2023-06-02 DIAGNOSIS — A04.8 H. PYLORI INFECTION: ICD-10-CM

## 2023-06-02 RX ORDER — PANTOPRAZOLE SODIUM 40 MG/1
40 TABLET, DELAYED RELEASE ORAL
Qty: 28 TABLET | Refills: 0 | Status: SHIPPED | OUTPATIENT
Start: 2023-06-02 | End: 2023-06-16

## 2023-06-11 DIAGNOSIS — A04.8 H. PYLORI INFECTION: ICD-10-CM

## 2023-06-11 RX ORDER — PANTOPRAZOLE SODIUM 40 MG/1
40 TABLET, DELAYED RELEASE ORAL
Qty: 28 TABLET | Refills: 0 | OUTPATIENT
Start: 2023-06-11 | End: 2023-06-25

## 2023-07-28 DIAGNOSIS — M54.9 MID BACK PAIN ON RIGHT SIDE: ICD-10-CM

## 2023-08-02 RX ORDER — CYCLOBENZAPRINE HCL 10 MG
10 TABLET ORAL 2 TIMES DAILY PRN
Qty: 30 TABLET | Refills: 1 | Status: SHIPPED | OUTPATIENT
Start: 2023-08-02

## 2023-08-03 DIAGNOSIS — M54.9 MID BACK PAIN ON RIGHT SIDE: ICD-10-CM

## 2023-08-04 RX ORDER — CYCLOBENZAPRINE HCL 10 MG
10 TABLET ORAL 2 TIMES DAILY PRN
Qty: 30 TABLET | Refills: 1 | OUTPATIENT
Start: 2023-08-04

## 2023-08-15 DIAGNOSIS — M54.9 MID BACK PAIN ON RIGHT SIDE: ICD-10-CM

## 2023-08-18 RX ORDER — CYCLOBENZAPRINE HCL 10 MG
10 TABLET ORAL 2 TIMES DAILY PRN
Qty: 30 TABLET | Refills: 1 | OUTPATIENT
Start: 2023-08-18

## 2023-09-15 ENCOUNTER — VBI (OUTPATIENT)
Dept: ADMINISTRATIVE | Facility: OTHER | Age: 43
End: 2023-09-15

## 2023-10-03 DIAGNOSIS — A04.8 H. PYLORI INFECTION: ICD-10-CM

## 2023-10-03 RX ORDER — PANTOPRAZOLE SODIUM 40 MG/1
40 TABLET, DELAYED RELEASE ORAL
Qty: 28 TABLET | Refills: 0 | Status: SHIPPED | OUTPATIENT
Start: 2023-10-03 | End: 2023-10-17

## 2023-10-04 ENCOUNTER — OFFICE VISIT (OUTPATIENT)
Dept: FAMILY MEDICINE CLINIC | Facility: CLINIC | Age: 43
End: 2023-10-04

## 2023-10-04 VITALS
WEIGHT: 265 LBS | OXYGEN SATURATION: 98 % | HEART RATE: 88 BPM | DIASTOLIC BLOOD PRESSURE: 84 MMHG | HEIGHT: 62 IN | TEMPERATURE: 97.7 F | BODY MASS INDEX: 48.76 KG/M2 | SYSTOLIC BLOOD PRESSURE: 128 MMHG | RESPIRATION RATE: 18 BRPM

## 2023-10-04 DIAGNOSIS — Z12.4 CERVICAL CANCER SCREENING: ICD-10-CM

## 2023-10-04 DIAGNOSIS — E11.9 TYPE 2 DIABETES MELLITUS WITHOUT COMPLICATION, WITHOUT LONG-TERM CURRENT USE OF INSULIN (HCC): ICD-10-CM

## 2023-10-04 DIAGNOSIS — K03.81 CRACKED TOOTH: ICD-10-CM

## 2023-10-04 DIAGNOSIS — Z00.00 ANNUAL PHYSICAL EXAM: Primary | ICD-10-CM

## 2023-10-04 LAB — SL AMB POCT HEMOGLOBIN AIC: 6.9 (ref ?–6.5)

## 2023-10-04 PROCEDURE — 99396 PREV VISIT EST AGE 40-64: CPT | Performed by: PHYSICIAN ASSISTANT

## 2023-10-04 PROCEDURE — 99213 OFFICE O/P EST LOW 20 MIN: CPT | Performed by: PHYSICIAN ASSISTANT

## 2023-10-04 PROCEDURE — 83036 HEMOGLOBIN GLYCOSYLATED A1C: CPT | Performed by: PHYSICIAN ASSISTANT

## 2023-10-04 NOTE — PROGRESS NOTES
[facility]    NAME: Eduardo Pitts  AGE: 39 y.o. SEX: female  : 1980     DATE: 10/4/2023     Assessment and Plan:     Problem List Items Addressed This Visit        Endocrine    Type 2 diabetes mellitus without complication, without long-term current use of insulin (HCC)     - POCT hemoglobin A1c is 6.9. This is decreased from 7.0 in May 2022.  - Recommended to start metformin, but patient would like to start working on lifestyle modifications first.  - Recommended starting to follow diabetic diet with focus on low intake of carbohydrates and sugars. - Continue follow-up with ophthalmology, [facility], scheduled. Lab Results   Component Value Date    HGBA1C 6.9 (A) 10/04/2023    HGBA1C 7.0 (H) 2022            Relevant Orders    POCT hemoglobin A1c (Completed)    Albumin / creatinine urine ratio    Comprehensive metabolic panel    CBC and differential    Lipid Panel with Direct LDL reflex   Other Visit Diagnoses     Annual physical exam    -  Primary    Cracked tooth        Relevant Orders    Ambulatory Referral to Dentistry    Cervical cancer screening        Relevant Orders    Ambulatory Referral to Obstetrics / Gynecology          Immunizations and preventive care screenings were discussed with patient today. Appropriate education was printed on patient's after visit summary. Counseling:  Alcohol/drug use: discussed moderation in alcohol intake, the recommendations for healthy alcohol use, and avoidance of illicit drug use. Dental Health: discussed importance of regular tooth brushing, flossing, and dental visits. Injury prevention: discussed safety/seat belts, safety helmets, smoke detectors, carbon dioxide detectors, and smoking near bedding or upholstery.   Sexual health: discussed sexually transmitted diseases, partner selection, use of condoms, avoidance of unintended pregnancy, and contraceptive alternatives. Exercise: the importance of regular exercise/physical activity was discussed. Recommend exercise 3-5 times per week for at least 30 minutes. BMI Counseling: Body mass index is 48.47 kg/m². The BMI is above normal. Nutrition recommendations include decreasing portion sizes, encouraging healthy choices of fruits and vegetables, consuming healthier snacks, limiting drinks that contain sugar, moderation in carbohydrate intake, increasing intake of lean protein and reducing intake of cholesterol. Exercise recommendations include moderate physical activity 150 minutes/week. No pharmacotherapy was ordered. Rationale for BMI follow-up plan is due to patient being overweight or obese. Depression Screening and Follow-up Plan: Patient was screened for depression during today's encounter. They screened negative with a PHQ-2 score of 0. Return in about 3 months (around 1/4/2024) for Next scheduled follow up T2DM. Chief Complaint:     Chief Complaint   Patient presents with   • Follow-up   • Diabetes      History of Present Illness:     Adult Annual Physical   Patient here for a comprehensive physical exam.     Diet and Physical Activity  Diet/Nutrition: well balanced diet. Exercise: walking. Depression Screening  PHQ-2/9 Depression Screening    Little interest or pleasure in doing things: 0 - not at all  Feeling down, depressed, or hopeless: 0 - not at all  PHQ-2 Score: 0  PHQ-2 Interpretation: Negative depression screen       General Health  Sleep: sleeping much improved since now sleeping on her side instead of her stomach. Hearing: normal - bilateral.  Vision: goes for regular eye exams and [place] [facility] Dental: no dental visits for >1 year and currently has a cracked tooth.        /GYN Health  Last menstrual period: 9/12/23, menses are regular, no issues  Contraceptive method: Tubal ligation     Review of Systems:     Review of Systems Constitutional: Negative for chills and fever. HENT: Negative for congestion, sore throat and trouble swallowing. Eyes: Negative for pain. Respiratory: Negative for cough, chest tightness and shortness of breath. Cardiovascular: Negative for chest pain, palpitations and leg swelling. Gastrointestinal: Negative for abdominal pain, constipation, diarrhea, nausea and vomiting. Genitourinary: Negative for difficulty urinating and dysuria. Musculoskeletal: Negative for arthralgias and back pain. Skin: Negative for rash. Neurological: Negative for dizziness and headaches. Psychiatric/Behavioral: The patient is not nervous/anxious. Past Medical History:     Past Medical History:   Diagnosis Date   • GERD (gastroesophageal reflux disease)       Past Surgical History:     Past Surgical History:   Procedure Laterality Date   • TUBAL LIGATION        Social History:     Social History     Socioeconomic History   • Marital status: Single     Spouse name: None   • Number of children: None   • Years of education: None   • Highest education level: None   Occupational History   • None   Tobacco Use   • Smoking status: Never   • Smokeless tobacco: Never   Substance and Sexual Activity   • Alcohol use: Never   • Drug use: Never   • Sexual activity: None   Other Topics Concern   • None   Social History Narrative   • None     Social Determinants of Health     Financial Resource Strain: Low Risk  (3/24/2022)    Overall Financial Resource Strain (CARDIA)    • Difficulty of Paying Living Expenses: Not very hard   Food Insecurity: No Food Insecurity (3/24/2022)    Hunger Vital Sign    • Worried About Running Out of Food in the Last Year: Never true    • Ran Out of Food in the Last Year: Never true   Transportation Needs: No Transportation Needs (3/24/2022)    PRAPARE - Transportation    • Lack of Transportation (Medical): No    • Lack of Transportation (Non-Medical):  No   Physical Activity: Not on file Stress: Not on file   Social Connections: Not on file   Intimate Partner Violence: Not on file   Housing Stability: Not on file       Social Determinants of Health     Tobacco Use: Low Risk  (10/5/2023)    Patient History    • Smoking Tobacco Use: Never    • Smokeless Tobacco Use: Never    • Passive Exposure: Not on file   Alcohol Use: Not on file   Financial Resource Strain: Low Risk  (3/24/2022)    Overall Financial Resource Strain (CARDIA)    • Difficulty of Paying Living Expenses: Not very hard   Food Insecurity: No Food Insecurity (3/24/2022)    Hunger Vital Sign    • Worried About Running Out of Food in the Last Year: Never true    • Ran Out of Food in the Last Year: Never true   Transportation Needs: No Transportation Needs (3/24/2022)    PRAPARE - Transportation    • Lack of Transportation (Medical): No    • Lack of Transportation (Non-Medical):  No   Physical Activity: Not on file   Stress: Not on file   Social Connections: Not on file   Intimate Partner Violence: Not on file   Depression: Not at risk (10/4/2023)    PHQ-2    • PHQ-2 Score: 0   Housing Stability: Not on file   Utilities: Not on file        Family History:     Family History   Problem Relation Age of Onset   • No Known Problems Mother    • Hypertension Father    • Diabetes Father    • Liver cancer Brother 40   • Liver cancer Brother    • Breast cancer Maternal Grandfather    • Stomach cancer Paternal Uncle       Current Medications:     Current Outpatient Medications   Medication Sig Dispense Refill   • cyclobenzaprine (FLEXERIL) 10 mg tablet TAKE 1 TABLET (10 MG TOTAL) BY MOUTH 2 (TWO) TIMES A DAY AS NEEDED FOR MUSCLE SPASMS 30 tablet 1   • naproxen (NAPROSYN) 500 mg tablet TAKE 1 TABLET (500 MG TOTAL) BY MOUTH 2 (TWO) TIMES A DAY AS NEEDED FOR MILD PAIN 30 tablet 1   • omeprazole (PriLOSEC) 40 MG capsule Take 1 capsule (40 mg total) by mouth 2 (two) times a day for 14 days 28 capsule 0   • pantoprazole (PROTONIX) 40 mg tablet TAKE 1 TABLET (40 MG TOTAL) BY MOUTH 2 (TWO) TIMES A DAY BEFORE MEALS FOR 14 DAYS 28 tablet 0     No current facility-administered medications for this visit. Allergies:     No Known Allergies   Physical Exam:     /84 (BP Location: Left arm, Patient Position: Sitting, Cuff Size: Large)   Pulse 88   Temp 97.7 °F (36.5 °C) (Temporal)   Resp 18   Ht 5' 2" (1.575 m)   Wt 120 kg (265 lb)   SpO2 98%   BMI 48.47 kg/m²     Physical Exam  Vitals and nursing note reviewed. Constitutional:       General: She is not in acute distress. Appearance: She is well-developed. HENT:      Head: Normocephalic and atraumatic. Right Ear: External ear normal.      Left Ear: External ear normal.      Nose: Nose normal.   Eyes:      Conjunctiva/sclera: Conjunctivae normal.   Cardiovascular:      Rate and Rhythm: Normal rate and regular rhythm. Pulses: Normal pulses. no weak pulses          Dorsalis pedis pulses are 2+ on the right side and 2+ on the left side. Posterior tibial pulses are 2+ on the right side and 2+ on the left side. Heart sounds: Normal heart sounds. Pulmonary:      Effort: Pulmonary effort is normal. No respiratory distress. Breath sounds: Normal breath sounds. No wheezing. Abdominal:      General: Bowel sounds are normal.      Palpations: Abdomen is soft. Tenderness: There is no abdominal tenderness. Musculoskeletal:         General: Normal range of motion. Cervical back: Normal range of motion and neck supple. Feet:      Right foot:      Skin integrity: No ulcer, skin breakdown, erythema, warmth, callus or dry skin. Left foot:      Skin integrity: No ulcer, skin breakdown, erythema, warmth, callus or dry skin. Skin:     General: Skin is warm and dry. Neurological:      Mental Status: She is alert and oriented to person, place, and time. Psychiatric:         Behavior: Behavior normal.         Diabetic Foot Exam    Patient's shoes and socks removed.     Right Foot/Ankle   Right Foot Inspection  Skin Exam: skin normal and skin intact. No dry skin, no warmth, no callus, no erythema, no maceration, no abnormal color, no pre-ulcer, no ulcer and no callus. Toe Exam: ROM and strength within normal limits. No swelling, no tenderness and erythema    Sensory   Monofilament testing: intact    Vascular  Capillary refills: < 3 seconds  The right DP pulse is 2+. The right PT pulse is 2+. Left Foot/Ankle  Left Foot Inspection  Skin Exam: skin normal and skin intact. No dry skin, no warmth, no erythema, no maceration, normal color, no pre-ulcer, no ulcer and no callus. Toe Exam: ROM and strength within normal limits. No swelling, no tenderness and no erythema. Sensory   Monofilament testing: intact    Vascular  Capillary refills: < 3 seconds  The left DP pulse is 2+. The left PT pulse is 2+.      Assign Risk Category  No deformity present  No loss of protective sensation  No weak pulses  Risk: 0      Ambar Esparza PA-C   [address] [place] [address]

## 2023-10-04 NOTE — PATIENT INSTRUCTIONS
[address] [facility]  Phone Number: [phone]  - Please call to schedule an appointment. Thanks!      [address]  [phone]

## 2023-10-05 ENCOUNTER — OFFICE VISIT (OUTPATIENT)
Dept: DENTISTRY | Facility: CLINIC | Age: 43
End: 2023-10-05

## 2023-10-05 VITALS — HEART RATE: 101 BPM | SYSTOLIC BLOOD PRESSURE: 148 MMHG | DIASTOLIC BLOOD PRESSURE: 88 MMHG | TEMPERATURE: 99.3 F

## 2023-10-05 DIAGNOSIS — S02.5XXA CLOSED FRACTURE OF TOOTH, INITIAL ENCOUNTER: Primary | ICD-10-CM

## 2023-10-05 PROCEDURE — D0140 LIMITED ORAL EVALUATION - PROBLEM FOCUSED: HCPCS

## 2023-10-05 PROCEDURE — D0220 INTRAORAL - PERIAPICAL FIRST RADIOGRAPHIC IMAGE: HCPCS

## 2023-10-05 NOTE — ASSESSMENT & PLAN NOTE
- POCT hemoglobin A1c is 6.9. This is decreased from 7.0 in May 2022.  - Recommended to start metformin, but patient would like to start working on lifestyle modifications first.  - Recommended starting to follow diabetic diet with focus on low intake of carbohydrates and sugars. - Continue follow-up with ophthalmology, [facility], scheduled.     Lab Results   Component Value Date    HGBA1C 6.9 (A) 10/04/2023    HGBA1C 7.0 (H) 05/11/2022

## 2023-10-06 ENCOUNTER — TELEPHONE (OUTPATIENT)
Dept: ADMINISTRATIVE | Facility: OTHER | Age: 43
End: 2023-10-06

## 2023-10-06 NOTE — TELEPHONE ENCOUNTER
As a follow-up, a second attempt has been made for outreach via fax to facility. Please see Contacts section for details.     Thank you  García Segundo MA

## 2023-10-06 NOTE — TELEPHONE ENCOUNTER
----- Message from Mercedes Mata PA-C sent at 10/5/2023  3:07 PM EDT -----  Regarding: DM eye exam  10/05/23 3:07 PM    Clay, our patient Kd Robles has had Diabetic Eye Exam completed/performed. Please assist in updating the patient chart by making an External outreach to [facility] facility located in [place], [place]. The date of service is 2023.     [place] [address]  [phone]      Thank you,  NAZIA Baker

## 2023-10-06 NOTE — LETTER
Diabetic Eye Exam Form    Date Requested: 10/06/23  Patient: Yakov Borges  Patient : 1980   Referring Provider: Maury Rivers PA-C      DIABETIC Eye Exam Date _______________________________      Type of Exam MUST be documented for Diabetic Eye Exams. Please CHECK ONE. Retinal Exam       Dilated Retinal Exam       OCT       Optomap-Iris Exam      Fundus Photography       Left Eye - Please check Retinopathy or No Retinopathy        Exam did show retinopathy    Exam did not show retinopathy       Right Eye - Please check Retinopathy or No Retinopathy       Exam did show retinopathy    Exam did not show retinopathy       Comments __________________________________________________________    Practice Providing Exam ______________________________________________    Exam Performed By (print name) _______________________________________      Provider Signature ___________________________________________________      These reports are needed for  compliance. Please fax this completed form and a copy of the Diabetic Eye Exam report to our office located at [address] [place] [address] as soon as possible via Fax [phone] attention Chiquita: Phone [phone]  We thank you for your assistance in treating our mutual patient.

## 2023-10-06 NOTE — DENTAL PROCEDURE DETAILS
Tameka Flanagan is a 39 y.o. female who presents for emergency exam with chief complaint pain in UL from broken tooth. Pain score: 6 out of 10. Pt states this has been ongoing for roughly 2 weeks. Pt explained to today's appt is only a limited exam intended to focus on solely the area of emergency. If pt desires evaluation of anything else, pt is to either: (1) return to the dentist with whom they are a pt of record, (2) make an appt to establish comprehensive care at clinic, or (3) make an appt with another practice to establish comprehensive care. Health history:  Significant/pertinent medical history: type 2 diabetes. Significant/pertinent medications: pt denies. Allergies: pt denies. Pt is ASA II. Dental history:  Does pt have a current dentist with whom they are a pt of record? no.  Last time pt saw a dentist: several years ago. Radiographic exam:  Radiographs taken: PAs: #13 . Findings: #13 fractured cusp very close to pulp. Clinical exam:  Findings: #13 buccal cusp fractured, gingival overgrowth over fractured cusp. #13 positive to cold test, positive to percussion, negative to palpation. Diagnosis: #13 buccal cusp fracture, symptomatic irreversible pulpitis. Prognosis: Attempt to save tooth with RCT/post and core/crown would have fair prognosis. Tx plan/pt disposition:  Pt advised to take tylenol and ibuprofen for pain management. Pt was given two options to address #13. Preferable option is RCT/post and core/crown, gingivectomy around tooth to remove gingival overgrowth over fractured area would be required. Pt informed  of  will discuss cost and insurance coverage with her. If finances are prohibitive, pt may opt for extraction instead. Pt highly recommended to establish comprehensive dental care somewhere, whether with the clinic or at another practice.     NV: VINCENZO, pt to decide how she wants to address #13 after gathering financial information. NNV: comprehensive exam and radiographs. Attending: Dr. Peter Eric was present and examined the radiograph.

## 2023-10-10 NOTE — TELEPHONE ENCOUNTER
Upon review of the In Basket request we were able to locate, review, and update the patient chart as requested for Diabetic Eye Exam.    Last exam 2022    Any additional questions or concerns should be emailed to the Practice Liaisons via the appropriate education email address, please do not reply via In Basket.     Thank you  Anjali Jacobsen MA

## 2023-10-12 DIAGNOSIS — M54.9 MID BACK PAIN ON RIGHT SIDE: ICD-10-CM

## 2023-10-15 RX ORDER — CYCLOBENZAPRINE HCL 10 MG
10 TABLET ORAL 2 TIMES DAILY PRN
Qty: 30 TABLET | Refills: 1 | Status: SHIPPED | OUTPATIENT
Start: 2023-10-15

## 2023-11-14 ENCOUNTER — TELEPHONE (OUTPATIENT)
Dept: OBGYN CLINIC | Facility: CLINIC | Age: 43
End: 2023-11-14

## 2024-01-22 DIAGNOSIS — M54.9 MID BACK PAIN ON RIGHT SIDE: ICD-10-CM

## 2024-01-22 DIAGNOSIS — A04.8 H. PYLORI INFECTION: ICD-10-CM

## 2024-01-22 RX ORDER — NAPROXEN 500 MG/1
500 TABLET ORAL 2 TIMES DAILY PRN
Qty: 30 TABLET | Refills: 1 | Status: SHIPPED | OUTPATIENT
Start: 2024-01-22

## 2024-01-22 RX ORDER — CYCLOBENZAPRINE HCL 10 MG
10 TABLET ORAL 2 TIMES DAILY PRN
Qty: 30 TABLET | Refills: 1 | Status: SHIPPED | OUTPATIENT
Start: 2024-01-22

## 2024-01-23 DIAGNOSIS — M54.9 MID BACK PAIN ON RIGHT SIDE: ICD-10-CM

## 2024-01-23 RX ORDER — PANTOPRAZOLE SODIUM 40 MG/1
40 TABLET, DELAYED RELEASE ORAL
Qty: 28 TABLET | Refills: 0 | Status: SHIPPED | OUTPATIENT
Start: 2024-01-23 | End: 2024-02-06

## 2024-01-24 RX ORDER — NAPROXEN 500 MG/1
500 TABLET ORAL 2 TIMES DAILY PRN
Qty: 30 TABLET | Refills: 1 | OUTPATIENT
Start: 2024-01-24

## 2024-01-24 RX ORDER — CYCLOBENZAPRINE HCL 10 MG
10 TABLET ORAL 2 TIMES DAILY PRN
Qty: 30 TABLET | Refills: 1 | OUTPATIENT
Start: 2024-01-24

## 2024-03-28 DIAGNOSIS — M54.9 MID BACK PAIN ON RIGHT SIDE: ICD-10-CM

## 2024-03-28 RX ORDER — CYCLOBENZAPRINE HCL 10 MG
10 TABLET ORAL 2 TIMES DAILY PRN
Qty: 30 TABLET | Refills: 1 | Status: SHIPPED | OUTPATIENT
Start: 2024-03-28

## 2024-03-28 RX ORDER — NAPROXEN 500 MG/1
500 TABLET ORAL 2 TIMES DAILY PRN
Qty: 30 TABLET | Refills: 1 | Status: SHIPPED | OUTPATIENT
Start: 2024-03-28

## 2024-04-24 DIAGNOSIS — A04.8 H. PYLORI INFECTION: ICD-10-CM

## 2024-04-24 RX ORDER — PANTOPRAZOLE SODIUM 40 MG/1
40 TABLET, DELAYED RELEASE ORAL
Qty: 28 TABLET | Refills: 0 | Status: SHIPPED | OUTPATIENT
Start: 2024-04-24 | End: 2024-05-06

## 2024-05-05 DIAGNOSIS — A04.8 H. PYLORI INFECTION: ICD-10-CM

## 2024-05-06 RX ORDER — PANTOPRAZOLE SODIUM 40 MG/1
40 TABLET, DELAYED RELEASE ORAL
Qty: 28 TABLET | Refills: 0 | Status: SHIPPED | OUTPATIENT
Start: 2024-05-06 | End: 2024-05-20

## 2024-05-22 DIAGNOSIS — A04.8 H. PYLORI INFECTION: ICD-10-CM

## 2024-05-23 RX ORDER — PANTOPRAZOLE SODIUM 40 MG/1
40 TABLET, DELAYED RELEASE ORAL
Qty: 28 TABLET | Refills: 2 | Status: SHIPPED | OUTPATIENT
Start: 2024-05-23 | End: 2024-06-06

## 2024-07-02 ENCOUNTER — APPOINTMENT (EMERGENCY)
Dept: RADIOLOGY | Facility: HOSPITAL | Age: 44
End: 2024-07-02
Payer: COMMERCIAL

## 2024-07-02 ENCOUNTER — HOSPITAL ENCOUNTER (EMERGENCY)
Facility: HOSPITAL | Age: 44
Discharge: HOME | End: 2024-07-02
Attending: EMERGENCY MEDICINE | Admitting: EMERGENCY MEDICINE
Payer: COMMERCIAL

## 2024-07-02 VITALS
TEMPERATURE: 98.2 F | BODY MASS INDEX: 43.1 KG/M2 | RESPIRATION RATE: 20 BRPM | HEART RATE: 72 BPM | SYSTOLIC BLOOD PRESSURE: 107 MMHG | OXYGEN SATURATION: 99 % | WEIGHT: 235.67 LBS | DIASTOLIC BLOOD PRESSURE: 63 MMHG

## 2024-07-02 DIAGNOSIS — R07.9 CHEST PAIN, UNSPECIFIED TYPE: Primary | ICD-10-CM

## 2024-07-02 DIAGNOSIS — K29.70 GASTRITIS: ICD-10-CM

## 2024-07-02 LAB
ALBUMIN SERPL BCG-MCNC: 4.3 G/DL (ref 3.5–5)
ALP SERPL-CCNC: 79 U/L (ref 34–104)
ALT SERPL W P-5'-P-CCNC: 21 U/L (ref 7–52)
ANION GAP SERPL CALCULATED.3IONS-SCNC: 8 MMOL/L (ref 4–13)
AST SERPL W P-5'-P-CCNC: 16 U/L (ref 13–39)
ATRIAL RATE: 71 BPM
ATRIAL RATE: 94 BPM
BASOPHILS # BLD AUTO: 0.04 THOUSANDS/ÂΜL (ref 0–0.1)
BASOPHILS NFR BLD AUTO: 0 % (ref 0–1)
BILIRUB SERPL-MCNC: 1.24 MG/DL (ref 0.2–1)
BUN SERPL-MCNC: 8 MG/DL (ref 5–25)
CALCIUM SERPL-MCNC: 9.6 MG/DL (ref 8.4–10.2)
CARDIAC TROPONIN I PNL SERPL HS: <2 NG/L
CARDIAC TROPONIN I PNL SERPL HS: <2 NG/L
CHLORIDE SERPL-SCNC: 105 MMOL/L (ref 96–108)
CO2 SERPL-SCNC: 26 MMOL/L (ref 21–32)
CREAT SERPL-MCNC: 0.7 MG/DL (ref 0.6–1.3)
EOSINOPHIL # BLD AUTO: 0.05 THOUSAND/ÂΜL (ref 0–0.61)
EOSINOPHIL NFR BLD AUTO: 0 % (ref 0–6)
ERYTHROCYTE [DISTWIDTH] IN BLOOD BY AUTOMATED COUNT: 12.4 % (ref 11.6–15.1)
EXT PREGNANCY TEST URINE: NEGATIVE
EXT. CONTROL: NORMAL
GFR SERPL CREATININE-BSD FRML MDRD: 104 ML/MIN/1.73SQ M
GLUCOSE SERPL-MCNC: 115 MG/DL (ref 65–140)
HCT VFR BLD AUTO: 43.3 % (ref 34.8–46.1)
HGB BLD-MCNC: 14.1 G/DL (ref 11.5–15.4)
IMM GRANULOCYTES # BLD AUTO: 0.06 THOUSAND/UL (ref 0–0.2)
IMM GRANULOCYTES NFR BLD AUTO: 0 % (ref 0–2)
LIPASE SERPL-CCNC: 21 U/L (ref 11–82)
LYMPHOCYTES # BLD AUTO: 2.16 THOUSANDS/ÂΜL (ref 0.6–4.47)
LYMPHOCYTES NFR BLD AUTO: 16 % (ref 14–44)
MCH RBC QN AUTO: 28.5 PG (ref 26.8–34.3)
MCHC RBC AUTO-ENTMCNC: 32.6 G/DL (ref 31.4–37.4)
MCV RBC AUTO: 88 FL (ref 82–98)
MONOCYTES # BLD AUTO: 1.06 THOUSAND/ÂΜL (ref 0.17–1.22)
MONOCYTES NFR BLD AUTO: 8 % (ref 4–12)
NEUTROPHILS # BLD AUTO: 10.46 THOUSANDS/ÂΜL (ref 1.85–7.62)
NEUTS SEG NFR BLD AUTO: 76 % (ref 43–75)
NRBC BLD AUTO-RTO: 0 /100 WBCS
P AXIS: 17 DEGREES
P AXIS: 23 DEGREES
PLATELET # BLD AUTO: 214 THOUSANDS/UL (ref 149–390)
PMV BLD AUTO: 13.7 FL (ref 8.9–12.7)
POTASSIUM SERPL-SCNC: 3.6 MMOL/L (ref 3.5–5.3)
PR INTERVAL: 128 MS
PR INTERVAL: 134 MS
PROT SERPL-MCNC: 7.9 G/DL (ref 6.4–8.4)
QRS AXIS: -17 DEGREES
QRS AXIS: -25 DEGREES
QRSD INTERVAL: 86 MS
QRSD INTERVAL: 88 MS
QT INTERVAL: 376 MS
QT INTERVAL: 418 MS
QTC INTERVAL: 454 MS
QTC INTERVAL: 470 MS
RBC # BLD AUTO: 4.94 MILLION/UL (ref 3.81–5.12)
SODIUM SERPL-SCNC: 139 MMOL/L (ref 135–147)
T WAVE AXIS: -1 DEGREES
T WAVE AXIS: 16 DEGREES
VENTRICULAR RATE: 71 BPM
VENTRICULAR RATE: 94 BPM
WBC # BLD AUTO: 13.83 THOUSAND/UL (ref 4.31–10.16)

## 2024-07-02 PROCEDURE — 99285 EMERGENCY DEPT VISIT HI MDM: CPT | Performed by: EMERGENCY MEDICINE

## 2024-07-02 PROCEDURE — 83690 ASSAY OF LIPASE: CPT | Performed by: EMERGENCY MEDICINE

## 2024-07-02 PROCEDURE — 84484 ASSAY OF TROPONIN QUANT: CPT | Performed by: EMERGENCY MEDICINE

## 2024-07-02 PROCEDURE — 81025 URINE PREGNANCY TEST: CPT | Performed by: EMERGENCY MEDICINE

## 2024-07-02 PROCEDURE — 85025 COMPLETE CBC W/AUTO DIFF WBC: CPT | Performed by: EMERGENCY MEDICINE

## 2024-07-02 PROCEDURE — 93010 ELECTROCARDIOGRAM REPORT: CPT | Performed by: STUDENT IN AN ORGANIZED HEALTH CARE EDUCATION/TRAINING PROGRAM

## 2024-07-02 PROCEDURE — 36415 COLL VENOUS BLD VENIPUNCTURE: CPT | Performed by: EMERGENCY MEDICINE

## 2024-07-02 PROCEDURE — 93005 ELECTROCARDIOGRAM TRACING: CPT

## 2024-07-02 PROCEDURE — 99285 EMERGENCY DEPT VISIT HI MDM: CPT

## 2024-07-02 PROCEDURE — 71045 X-RAY EXAM CHEST 1 VIEW: CPT

## 2024-07-02 PROCEDURE — 96374 THER/PROPH/DIAG INJ IV PUSH: CPT

## 2024-07-02 PROCEDURE — 80053 COMPREHEN METABOLIC PANEL: CPT | Performed by: EMERGENCY MEDICINE

## 2024-07-02 RX ORDER — SODIUM CHLORIDE 9 MG/ML
3 INJECTION INTRAVENOUS
Status: DISCONTINUED | OUTPATIENT
Start: 2024-07-02 | End: 2024-07-02 | Stop reason: HOSPADM

## 2024-07-02 RX ORDER — FAMOTIDINE 10 MG/ML
20 INJECTION, SOLUTION INTRAVENOUS ONCE
Status: COMPLETED | OUTPATIENT
Start: 2024-07-02 | End: 2024-07-02

## 2024-07-02 RX ORDER — ASPIRIN 81 MG/1
324 TABLET, CHEWABLE ORAL ONCE
Status: COMPLETED | OUTPATIENT
Start: 2024-07-02 | End: 2024-07-02

## 2024-07-02 RX ORDER — FAMOTIDINE 20 MG/1
20 TABLET, FILM COATED ORAL 2 TIMES DAILY
Qty: 30 TABLET | Refills: 0 | Status: SHIPPED | OUTPATIENT
Start: 2024-07-02 | End: 2024-07-16

## 2024-07-02 RX ADMIN — FAMOTIDINE 20 MG: 10 INJECTION, SOLUTION INTRAVENOUS at 11:40

## 2024-07-02 RX ADMIN — ASPIRIN 81 MG CHEWABLE TABLET 324 MG: 81 TABLET CHEWABLE at 11:38

## 2024-07-02 NOTE — Clinical Note
Saundar Ralph was seen and treated in our emergency department on 7/2/2024.                Diagnosis:     Saundra  .    She may return on this date: 07/04/2024         If you have any questions or concerns, please don't hesitate to call.      César Manriquez MD    ______________________________           _______________          _______________  Hospital Representative                              Date                                Time

## 2024-07-02 NOTE — ED PROVIDER NOTES
History  Chief Complaint   Patient presents with    Chest Pain     Chest pain since last week - intermittent. Reports pain is in the center of her chest. Reports hx of gastritis - feels like her gastritis. Reports she took something for her stomach. Reports a lot of nausea last night. Reports diarrhea. Denies vomiting. Denies fevers     Patient episode of chest pain a week ago returned again last night describes it as a burning in her chest sometimes radiates to her back she says she feels mildly short of breath she has a history of gastritis she says it feels like that she denies any vomiting he is having some loose stools no fever no cough no pain in her extremities patient denies smoking      History provided by:  Patient   used: Yes    Chest Pain  Associated symptoms: no abdominal pain, no cough, no fever, no palpitations and not vomiting        Prior to Admission Medications   Prescriptions Last Dose Informant Patient Reported? Taking?   cyclobenzaprine (FLEXERIL) 10 mg tablet Not Taking  No No   Sig: TAKE 1 TABLET (10 MG TOTAL) BY MOUTH 2 (TWO) TIMES A DAY AS NEEDED FOR MUSCLE SPASMS   Patient not taking: Reported on 7/2/2024   naproxen (NAPROSYN) 500 mg tablet Not Taking  No No   Sig: TAKE 1 TABLET (500 MG TOTAL) BY MOUTH 2 (TWO) TIMES A DAY AS NEEDED FOR MILD PAIN   Patient not taking: Reported on 7/2/2024   pantoprazole (PROTONIX) 40 mg tablet   No No   Sig: TAKE 1 TABLET (40 MG TOTAL) BY MOUTH 2 (TWO) TIMES A DAY BEFORE MEALS FOR 14 DAYS      Facility-Administered Medications: None       Past Medical History:   Diagnosis Date    GERD (gastroesophageal reflux disease)        Past Surgical History:   Procedure Laterality Date    TUBAL LIGATION         Family History   Problem Relation Age of Onset    No Known Problems Mother     Hypertension Father     Diabetes Father     Liver cancer Brother 44    Liver cancer Brother     Breast cancer Maternal Grandfather     Stomach cancer Paternal  Uncle      I have reviewed and agree with the history as documented.    E-Cigarette/Vaping     E-Cigarette/Vaping Substances     Social History     Tobacco Use    Smoking status: Never    Smokeless tobacco: Never   Substance Use Topics    Alcohol use: Never    Drug use: Never       Review of Systems   Constitutional:  Negative for chills and fever.   HENT:  Negative for sore throat.    Eyes:  Negative for visual disturbance.   Respiratory:  Negative for cough.    Cardiovascular:  Positive for chest pain. Negative for palpitations.   Gastrointestinal:  Negative for abdominal pain and vomiting.   Musculoskeletal:  Negative for arthralgias and neck pain.   Skin:  Negative for color change and rash.   Neurological:  Negative for seizures and syncope.   All other systems reviewed and are negative.      Physical Exam  Physical Exam  Vitals and nursing note reviewed.   Constitutional:       General: She is not in acute distress.     Appearance: She is well-developed.   HENT:      Head: Normocephalic and atraumatic.   Eyes:      Conjunctiva/sclera: Conjunctivae normal.   Cardiovascular:      Rate and Rhythm: Normal rate and regular rhythm.      Heart sounds: No murmur heard.     No friction rub. No gallop.   Pulmonary:      Effort: Pulmonary effort is normal. No respiratory distress.      Breath sounds: Normal breath sounds. No wheezing, rhonchi or rales.   Abdominal:      Palpations: Abdomen is soft.      Tenderness: There is no abdominal tenderness.   Musculoskeletal:         General: No swelling. Normal range of motion.      Cervical back: Neck supple.      Right lower leg: No edema.      Left lower leg: No edema.   Skin:     General: Skin is warm and dry.      Capillary Refill: Capillary refill takes less than 2 seconds.      Coloration: Skin is not cyanotic.   Neurological:      General: No focal deficit present.      Mental Status: She is alert.   Psychiatric:         Mood and Affect: Mood normal.         Vital  Signs  ED Triage Vitals   Temperature Pulse Respirations Blood Pressure SpO2   07/02/24 1140 07/02/24 1140 07/02/24 1140 07/02/24 1140 07/02/24 1140   98.2 °F (36.8 °C) (!) 112 (!) 24 151/90 98 %      Temp Source Heart Rate Source Patient Position - Orthostatic VS BP Location FiO2 (%)   07/02/24 1140 07/02/24 1140 07/02/24 1140 07/02/24 1140 --   Oral Monitor Sitting Left arm       Pain Score       07/02/24 1411       No Pain           Vitals:    07/02/24 1140 07/02/24 1233 07/02/24 1411   BP: 151/90 111/73 107/63   Pulse: (!) 112 82 72   Patient Position - Orthostatic VS: Sitting Lying Lying         Visual Acuity      ED Medications  Medications   sodium chloride (PF) 0.9 % injection 3 mL (has no administration in time range)   aspirin chewable tablet 324 mg (324 mg Oral Given 7/2/24 1138)   Famotidine (PF) (PEPCID) injection 20 mg (20 mg Intravenous Given 7/2/24 1140)       Diagnostic Studies  Results Reviewed       Procedure Component Value Units Date/Time    HS Troponin I 2hr [[id number]] Collected: 07/02/24 1326    Lab Status: Final result Specimen: Blood from Arm, Right Updated: 07/02/24 1404     hs TnI 2hr <2 ng/L      Delta 2hr hsTnI --    HS Troponin I 4hr [[id number]]     Lab Status: No result Specimen: Blood     HS Troponin 0hr (reflex protocol) [[id number]]  (Normal) Collected: 07/02/24 1138    Lab Status: Final result Specimen: Blood from Arm, Right Updated: 07/02/24 1216     hs TnI 0hr <2 ng/L     Comprehensive metabolic panel [[id number]]  (Abnormal) Collected: 07/02/24 1138    Lab Status: Final result Specimen: Blood from Arm, Right Updated: 07/02/24 1210     Sodium 139 mmol/L      Potassium 3.6 mmol/L      Chloride 105 mmol/L      CO2 26 mmol/L      ANION GAP 8 mmol/L      BUN 8 mg/dL      Creatinine 0.70 mg/dL      Glucose 115 mg/dL      Calcium 9.6 mg/dL      AST 16 U/L      ALT 21 U/L      Alkaline Phosphatase 79 U/L      Total Protein 7.9 g/dL      Albumin 4.3 g/dL      Total Bilirubin 1.24 mg/dL       eGFR 104 ml/min/1.73sq m     Narrative:      National Kidney Disease Foundation guidelines for Chronic Kidney Disease (CKD):     Stage 1 with normal or high GFR (GFR > 90 mL/min/1.73 square meters)    Stage 2 Mild CKD (GFR = 60-89 mL/min/1.73 square meters)    Stage 3A Moderate CKD (GFR = 45-59 mL/min/1.73 square meters)    Stage 3B Moderate CKD (GFR = 30-44 mL/min/1.73 square meters)    Stage 4 Severe CKD (GFR = 15-29 mL/min/1.73 square meters)    Stage 5 End Stage CKD (GFR <15 mL/min/1.73 square meters)  Note: GFR calculation is accurate only with a steady state creatinine    Lipase [[id number]]  (Normal) Collected: 07/02/24 1138    Lab Status: Final result Specimen: Blood from Arm, Right Updated: 07/02/24 1210     Lipase 21 u/L     POCT pregnancy, urine [[id number]]  (Normal) Resulted: 07/02/24 1158    Lab Status: Final result Updated: 07/02/24 1159     EXT Preg Test, Ur Negative     Control Valid    CBC and differential [[id number]]  (Abnormal) Collected: 07/02/24 1138    Lab Status: Final result Specimen: Blood from Arm, Right Updated: 07/02/24 1156     WBC 13.83 Thousand/uL      RBC 4.94 Million/uL      Hemoglobin 14.1 g/dL      Hematocrit 43.3 %      MCV 88 fL      MCH 28.5 pg      MCHC 32.6 g/dL      RDW 12.4 %      MPV 13.7 fL      Platelets 214 Thousands/uL      nRBC 0 /100 WBCs      Segmented % 76 %      Immature Grans % 0 %      Lymphocytes % 16 %      Monocytes % 8 %      Eosinophils Relative 0 %      Basophils Relative 0 %      Absolute Neutrophils 10.46 Thousands/µL      Absolute Immature Grans 0.06 Thousand/uL      Absolute Lymphocytes 2.16 Thousands/µL      Absolute Monocytes 1.06 Thousand/µL      Eosinophils Absolute 0.05 Thousand/µL      Basophils Absolute 0.04 Thousands/µL                    X-ray chest 1 view portable   ED Interpretation by César Manriquez MD (07/02 1200)   nad                 Procedures  ECG 12 Lead Documentation Only    Date/Time: 7/2/2024 11:42 AM    Performed by: César Manriquez  MD  Authorized by: César Manriquez MD    Indications / Diagnosis:  Cp  Patient location:  ED  Interpretation:     Interpretation: normal    Rate:     ECG rate:  94  Rhythm:     Rhythm: sinus rhythm    Ectopy:     Ectopy: none    QRS:     QRS intervals:  Normal  ST segments:     ST segments:  Non-specific  Comments:      Qt nml           ED Course  ED Course as of 07/02/24 1413   Tue Jul 02, 2024   1335 Second EKG at 1334 normal sinus rhythm at 71 bpm unchanged             HEART Risk Score      Flowsheet Row Most Recent Value   Heart Score Risk Calculator    History 0 Filed at: 07/02/2024 1409   ECG 0 Filed at: 07/02/2024 1409   Age 0 Filed at: 07/02/2024 1409   Risk Factors 1 Filed at: 07/02/2024 1409   Troponin 0 Filed at: 07/02/2024 1409   HEART Score 1 Filed at: 07/02/2024 1409                          SBIRT 22yo+      Flowsheet Row Most Recent Value   Initial Alcohol Screen: US AUDIT-C     1. How often do you have a drink containing alcohol? 0 Filed at: 07/02/2024 1137   2. How many drinks containing alcohol do you have on a typical day you are drinking?  0 Filed at: 07/02/2024 1137   3b. FEMALE Any Age, or MALE 65+: How often do you have 4 or more drinks on one occassion? 0 Filed at: 07/02/2024 1137   Audit-C Score 0 Filed at: 07/02/2024 1137   MARIA G: How many times in the past year have you...    Used an illegal drug or used a prescription medication for non-medical reasons? Never Filed at: 07/02/2024 1137                      Medical Decision Making  Patient presented for chest pain with a history of gastritis and burning sensation in her chest clinically I feel this is most likely gastritis but she did have a cardiac workup here including 2 normal EKGs as well as 2 troponins that were last 2 no delta change patient got better with the Pepcid here clinically not ischemia PE pneumonia chest x-ray showed no infiltrates or pneumothorax patient is felt safe for discharge electrolytes LFTs lipase normal abdomen  "benign  All imaging and/or lab testing discussed with patient, strict return to ED precautions discussed. Patient recommended to follow up promptly with appropriate outpatient provider.Patient and/or family members verbalizes understanding and agrees with plan. Patient is stable for discharge.     Portions of the record may have been created with voice recognition software. Occasional wrong word or \"sound a like\" substitutions may have occurred due to the inherent limitations of voice recognition software. Read the chart carefully and recognize, using context, where substitutions have occurred.    Amount and/or Complexity of Data Reviewed  Labs: ordered.  Radiology: ordered and independent interpretation performed.    Risk  OTC drugs.  Prescription drug management.             Disposition  Final diagnoses:   Chest pain, unspecified type   Gastritis     Time reflects when diagnosis was documented in both MDM as applicable and the Disposition within this note       Time User Action Codes Description Comment    7/2/2024  2:11 PM César Manriquez [R07.9] Chest pain, unspecified type     7/2/2024  2:11 PM César Manriquez [K29.70] Gastritis           ED Disposition       ED Disposition   Discharge    Condition   Stable    Date/Time   Tue Jul 2, 2024 1410    Comment   Saundra Ralph discharge to home/self care.                   Follow-up Information       Follow up With Specialties Details Why Contact Info    Ambar Esparza PA-C Family Medicine In 3 days  [address] [place] [address]  [phone]              Patient's Medications   Discharge Prescriptions    FAMOTIDINE (PEPCID) 20 MG TABLET    Take 1 tablet (20 mg total) by mouth 2 (two) times a day for 14 days       Start Date: 7/2/2024  End Date: 7/16/2024       Order Dose: 20 mg       Quantity: 30 tablet    Refills: 0       No discharge procedures on file.    PDMP Review       None            ED Provider  Electronically Signed by           "   César Manriquez MD  07/05/24 0853

## 2024-07-02 NOTE — Clinical Note
Saundra Ralph was seen and treated in our emergency department on 7/2/2024.                Diagnosis:     Saundra  .    She may return on this date: 07/04/2024         If you have any questions or concerns, please don't hesitate to call.      César Manriquez MD    ______________________________           _______________          _______________  Hospital Representative                              Date                                Time

## 2024-07-08 ENCOUNTER — TELEPHONE (OUTPATIENT)
Dept: GASTROENTEROLOGY | Facility: MEDICAL CENTER | Age: 44
End: 2024-07-08

## 2024-07-08 ENCOUNTER — OFFICE VISIT (OUTPATIENT)
Dept: GASTROENTEROLOGY | Facility: MEDICAL CENTER | Age: 44
End: 2024-07-08
Payer: COMMERCIAL

## 2024-07-08 VITALS
DIASTOLIC BLOOD PRESSURE: 83 MMHG | BODY MASS INDEX: 43.24 KG/M2 | WEIGHT: 235 LBS | SYSTOLIC BLOOD PRESSURE: 129 MMHG | TEMPERATURE: 98.6 F | OXYGEN SATURATION: 98 % | HEART RATE: 85 BPM | HEIGHT: 62 IN

## 2024-07-08 DIAGNOSIS — Z12.11 SCREENING FOR COLON CANCER: ICD-10-CM

## 2024-07-08 DIAGNOSIS — K21.9 GASTROESOPHAGEAL REFLUX DISEASE, UNSPECIFIED WHETHER ESOPHAGITIS PRESENT: ICD-10-CM

## 2024-07-08 DIAGNOSIS — R13.10 DYSPHAGIA, UNSPECIFIED TYPE: ICD-10-CM

## 2024-07-08 DIAGNOSIS — R10.84 GENERALIZED ABDOMINAL CRAMPING: ICD-10-CM

## 2024-07-08 DIAGNOSIS — A04.8 H. PYLORI INFECTION: Primary | ICD-10-CM

## 2024-07-08 PROCEDURE — 99214 OFFICE O/P EST MOD 30 MIN: CPT | Performed by: INTERNAL MEDICINE

## 2024-07-08 RX ORDER — PANTOPRAZOLE SODIUM 40 MG/1
40 TABLET, DELAYED RELEASE ORAL
Qty: 60 TABLET | Refills: 2 | Status: SHIPPED | OUTPATIENT
Start: 2024-07-08

## 2024-07-08 RX ORDER — DICYCLOMINE HCL 20 MG
20 TABLET ORAL EVERY 6 HOURS
Qty: 120 TABLET | Refills: 0 | Status: SHIPPED | OUTPATIENT
Start: 2024-07-08

## 2024-07-08 NOTE — PROGRESS NOTES
[facility] - Outpatient Follow-up Note  Saundra Ralph 44 y.o. female MRN: [id number]  Encounter: [id number]          ASSESSMENT AND PLAN:  44-year-old female with no significant past medical history who presents for evaluation.    1. H. pylori infection  2. Gastroesophageal reflux disease, unspecified whether esophagitis present  3. Dysphagia, unspecified type  She was previously seen for reflux symptoms and dysphagia.  She underwent an EGD in 2022 showing a hiatal hernia, Schatzki's ring which was balloon dilated and obliterated with tissue forceps.  Her gastric biopsies were positive for H. pylori and she completed a course of antibiotics but did not complete eradication testing.  She was seen in the emergency room recently for recurrent chest pain and cardiac workup was negative.  Her symptoms may be related to esophagitis, peptic ulcer disease, persistent H. pylori infection.  Recommend continuing pantoprazole twice daily and scheduling diagnostic EGD for further evaluation, perform dilation if needed in addition to continuing dietary and lifestyle antireflux measures  - pantoprazole (PROTONIX) 40 mg tablet; Take 1 tablet (40 mg total) by mouth 2 (two) times a day before meals  Dispense: 60 tablet; Refill: 2        4. Screening for colon cancer  She has no prior colonoscopy and is due at this time given her age of 44.  She is average risk for colon cancer screening. I obtained informed consent from the patient. The risks/benefits/alternatives of the procedure were discussed with the patient. Risks included, but not limited to, infection, bleeding, perforation, injury to organs in the abdomen, missed lesion and incomplete procedure were discussed. Patient was agreeable and electronic signature was obtained.      5. Generalized abdominal cramping  She reports symptoms of generalized abdominal cramping and rectal urgency.  She has regular formed bowel movements but at times can  have looser stools.  She previously was ordered for testing for stool and serologic testing which was not completed.  Duodenal and random colon biopsies can be obtained at the time of her upcoming endoscopic evaluation.  For her abdominal cramping and recommend that she use Bentyl up to 4 times a day as needed and avoid food foods which may trigger her symptoms.  - dicyclomine (BENTYL) 20 mg tablet; Take 1 tablet (20 mg total) by mouth every 6 (six) hours As needed for abdominal pain  Dispense: 120 tablet; Refill: 0      There are no diagnoses linked to this encounter.  ______________________________________________________________________    SUBJECTIVE: 44-year-old female with no significant past medical history who presents for evaluation.    She was previously seen in the GI office in 2022 for history of GERD, dysphagia symptoms.  She also had a history of chronic diarrhea and elevated LFTs and was recommended to undergo stool and serologic testing.    Interval history: She underwent EGD in 2022 showing Schatzki's ring which was balloon dilated and obliterated with tissue forceps, small hiatal hernia and mild gastritis.  Gastric biopsies were positive for H. pylori, duodenal and esophageal biopsies were normal other than findings of reflux esophagitis.  She was prescribed a course of antibiotics but stool testing was not performed to confirm eradication.    She presented to the ER on 7/2 for chest pain.  She underwent cardiac workup which was normal and x-ray which showed no acute abnormality contributing to her symptoms.  She was prescribed Pepcid and discharged home.  She reports continuing to use pantoprazole at home and Pepcid which has been helping to relieve her symptoms.  She continues to have recurrent dysphagia to solid food.  She denies odynophagia.  She has no nausea or vomiting.  She reports regular, formed bowel movements without melena or hematochezia.  She denies regular NSAID use.  There are times  "where she eats and feels like she has to have a bowel movement very quickly which associated with generalized abdominal cramping    She reports no family history of colon cancer    7/2024 liver enzymes are within normal limits other than total bilirubin 1.2, hemoglobin 14.1, platelets 214    She has no prior colonoscopy    REVIEW OF SYSTEMS IS OTHERWISE NEGATIVE.  10 point review of systems is negative other than stated as per HPI    Historical Information   Past Medical History:   Diagnosis Date    GERD (gastroesophageal reflux disease)      Past Surgical History:   Procedure Laterality Date    TUBAL LIGATION       Social History   Social History     Substance and Sexual Activity   Alcohol Use Never     Social History     Substance and Sexual Activity   Drug Use Never     Social History     Tobacco Use   Smoking Status Never   Smokeless Tobacco Never     Family History   Problem Relation Age of Onset    No Known Problems Mother     Hypertension Father     Diabetes Father     Liver cancer Brother 44    Liver cancer Brother     Breast cancer Maternal Grandfather     Stomach cancer Paternal Uncle        Meds/Allergies       Current Outpatient Medications:     famotidine (PEPCID) 20 mg tablet    cyclobenzaprine (FLEXERIL) 10 mg tablet    naproxen (NAPROSYN) 500 mg tablet    pantoprazole (PROTONIX) 40 mg tablet    No Known Allergies        Objective     Blood pressure 129/83, pulse 85, temperature 98.6 °F (37 °C), temperature source Tympanic, height 5' 2\" (1.575 m), weight 107 kg (235 lb), last menstrual period 06/12/2024, SpO2 98%. Body mass index is 42.98 kg/m².      PHYSICAL EXAM:      General Appearance:   Alert, cooperative, no distress   HEENT:   Normocephalic, atraumatic, anicteric.     Neck:  Supple, symmetrical, trachea midline   Lungs:   Clear to auscultation bilaterally; no rales, rhonchi or wheezing; respirations unlabored    Heart::   Regular rate and rhythm; no murmur, rub, or gallop.   Abdomen:   Soft, " non-tender, non-distended; normal bowel sounds; no masses, no organomegaly    Genitalia:   Deferred    Rectal:   Deferred    Extremities:  No cyanosis, clubbing or edema    Pulses:  2+ and symmetric    Skin:  No jaundice, rashes, or lesions    Lymph nodes:  No palpable cervical lymphadenopathy        Lab Results:   No visits with results within 1 Day(s) from this visit.   Latest known visit with results is:   Admission on 07/02/2024, Discharged on 07/02/2024   Component Date Value    WBC 07/02/2024 13.83 (H)     RBC 07/02/2024 4.94     Hemoglobin 07/02/2024 14.1     Hematocrit 07/02/2024 43.3     MCV 07/02/2024 88     MCH 07/02/2024 28.5     MCHC 07/02/2024 32.6     RDW 07/02/2024 12.4     MPV 07/02/2024 13.7 (H)     Platelets 07/02/2024 214     nRBC 07/02/2024 0     Segmented % 07/02/2024 76 (H)     Immature Grans % 07/02/2024 0     Lymphocytes % 07/02/2024 16     Monocytes % 07/02/2024 8     Eosinophils Relative 07/02/2024 0     Basophils Relative 07/02/2024 0     Absolute Neutrophils 07/02/2024 10.46 (H)     Absolute Immature Grans 07/02/2024 0.06     Absolute Lymphocytes 07/02/2024 2.16     Absolute Monocytes 07/02/2024 1.06     Eosinophils Absolute 07/02/2024 0.05     Basophils Absolute 07/02/2024 0.04     hs TnI 0hr 07/02/2024 <2     Sodium 07/02/2024 139     Potassium 07/02/2024 3.6     Chloride 07/02/2024 105     CO2 07/02/2024 26     ANION GAP 07/02/2024 8     BUN 07/02/2024 8     Creatinine 07/02/2024 0.70     Glucose 07/02/2024 115     Calcium 07/02/2024 9.6     AST 07/02/2024 16     ALT 07/02/2024 21     Alkaline Phosphatase 07/02/2024 79     Total Protein 07/02/2024 7.9     Albumin 07/02/2024 4.3     Total Bilirubin 07/02/2024 1.24 (H)     eGFR 07/02/2024 104     Lipase 07/02/2024 21     EXT Preg Test, Ur 07/02/2024 Negative     Control 07/02/2024 Valid     hs TnI 2hr 07/02/2024 <2     Delta 2hr hsTnI 07/02/2024      Ventricular Rate 07/02/2024 94     Atrial Rate 07/02/2024 94     LA Interval 07/02/2024  128     QRSD Interval 07/02/2024 88     QT Interval 07/02/2024 376     QTC Interval 07/02/2024 470     P Axis 07/02/2024 23     QRS Axis 07/02/2024 -25     T Wave Axis 07/02/2024 16     Ventricular Rate 07/02/2024 71     Atrial Rate 07/02/2024 71     NV Interval 07/02/2024 134     QRSD Interval 07/02/2024 86     QT Interval 07/02/2024 418     QTC Interval 07/02/2024 454     P Axis 07/02/2024 17     QRS Axis 07/02/2024 -17     T Wave Axis 07/02/2024 -1          Radiology Results:   X-ray chest 1 view portable    Result Date: 7/3/2024  Narrative: XR CHEST PORTABLE INDICATION: chest pain. COMPARISON: None Single view FINDINGS: Clear lungs. No pneumothorax or pleural effusion. Normal cardiomediastinal silhouette. Bones are unremarkable for age. Normal upper abdomen.     Impression: No acute cardiopulmonary disease. Workstation performed: [id number]       This note was completed in part utilizing Dragon Software. Grammatical errors, random word insertions, spelling mistakes, and incomplete sentences may be an occasional consequence of this system secondary to software limitations, ambient noise, and hardware issues. If you have any questions or concerns about the content, text, or information contained within the body of this dictation, please contact the provider for clarification.

## 2024-07-08 NOTE — TELEPHONE ENCOUNTER
Procedure: Colon/EGD  Date: 07/31/2024  Physician performing: Dr. Jaiyeola  Location of procedure:  [place]  Instructions given to patient: Miralax  Diabetic: N/A  Clearances: N/A

## 2024-07-08 NOTE — H&P (VIEW-ONLY)
[facility] - Outpatient Follow-up Note  Saundra Ralph 44 y.o. female MRN: [id number]  Encounter: [id number]          ASSESSMENT AND PLAN:  44-year-old female with no significant past medical history who presents for evaluation.    1. H. pylori infection  2. Gastroesophageal reflux disease, unspecified whether esophagitis present  3. Dysphagia, unspecified type  She was previously seen for reflux symptoms and dysphagia.  She underwent an EGD in 2022 showing a hiatal hernia, Schatzki's ring which was balloon dilated and obliterated with tissue forceps.  Her gastric biopsies were positive for H. pylori and she completed a course of antibiotics but did not complete eradication testing.  She was seen in the emergency room recently for recurrent chest pain and cardiac workup was negative.  Her symptoms may be related to esophagitis, peptic ulcer disease, persistent H. pylori infection.  Recommend continuing pantoprazole twice daily and scheduling diagnostic EGD for further evaluation, perform dilation if needed in addition to continuing dietary and lifestyle antireflux measures  - pantoprazole (PROTONIX) 40 mg tablet; Take 1 tablet (40 mg total) by mouth 2 (two) times a day before meals  Dispense: 60 tablet; Refill: 2        4. Screening for colon cancer  She has no prior colonoscopy and is due at this time given her age of 44.  She is average risk for colon cancer screening. I obtained informed consent from the patient. The risks/benefits/alternatives of the procedure were discussed with the patient. Risks included, but not limited to, infection, bleeding, perforation, injury to organs in the abdomen, missed lesion and incomplete procedure were discussed. Patient was agreeable and electronic signature was obtained.      5. Generalized abdominal cramping  She reports symptoms of generalized abdominal cramping and rectal urgency.  She has regular formed bowel movements but at times can  have looser stools.  She previously was ordered for testing for stool and serologic testing which was not completed.  Duodenal and random colon biopsies can be obtained at the time of her upcoming endoscopic evaluation.  For her abdominal cramping and recommend that she use Bentyl up to 4 times a day as needed and avoid food foods which may trigger her symptoms.  - dicyclomine (BENTYL) 20 mg tablet; Take 1 tablet (20 mg total) by mouth every 6 (six) hours As needed for abdominal pain  Dispense: 120 tablet; Refill: 0      There are no diagnoses linked to this encounter.  ______________________________________________________________________    SUBJECTIVE: 44-year-old female with no significant past medical history who presents for evaluation.    She was previously seen in the GI office in 2022 for history of GERD, dysphagia symptoms.  She also had a history of chronic diarrhea and elevated LFTs and was recommended to undergo stool and serologic testing.    Interval history: She underwent EGD in 2022 showing Schatzki's ring which was balloon dilated and obliterated with tissue forceps, small hiatal hernia and mild gastritis.  Gastric biopsies were positive for H. pylori, duodenal and esophageal biopsies were normal other than findings of reflux esophagitis.  She was prescribed a course of antibiotics but stool testing was not performed to confirm eradication.    She presented to the ER on 7/2 for chest pain.  She underwent cardiac workup which was normal and x-ray which showed no acute abnormality contributing to her symptoms.  She was prescribed Pepcid and discharged home.  She reports continuing to use pantoprazole at home and Pepcid which has been helping to relieve her symptoms.  She continues to have recurrent dysphagia to solid food.  She denies odynophagia.  She has no nausea or vomiting.  She reports regular, formed bowel movements without melena or hematochezia.  She denies regular NSAID use.  There are times  "where she eats and feels like she has to have a bowel movement very quickly which associated with generalized abdominal cramping    She reports no family history of colon cancer    7/2024 liver enzymes are within normal limits other than total bilirubin 1.2, hemoglobin 14.1, platelets 214    She has no prior colonoscopy    REVIEW OF SYSTEMS IS OTHERWISE NEGATIVE.  10 point review of systems is negative other than stated as per HPI    Historical Information   Past Medical History:   Diagnosis Date    GERD (gastroesophageal reflux disease)      Past Surgical History:   Procedure Laterality Date    TUBAL LIGATION       Social History   Social History     Substance and Sexual Activity   Alcohol Use Never     Social History     Substance and Sexual Activity   Drug Use Never     Social History     Tobacco Use   Smoking Status Never   Smokeless Tobacco Never     Family History   Problem Relation Age of Onset    No Known Problems Mother     Hypertension Father     Diabetes Father     Liver cancer Brother 44    Liver cancer Brother     Breast cancer Maternal Grandfather     Stomach cancer Paternal Uncle        Meds/Allergies       Current Outpatient Medications:     famotidine (PEPCID) 20 mg tablet    cyclobenzaprine (FLEXERIL) 10 mg tablet    naproxen (NAPROSYN) 500 mg tablet    pantoprazole (PROTONIX) 40 mg tablet    No Known Allergies        Objective     Blood pressure 129/83, pulse 85, temperature 98.6 °F (37 °C), temperature source Tympanic, height 5' 2\" (1.575 m), weight 107 kg (235 lb), last menstrual period 06/12/2024, SpO2 98%. Body mass index is 42.98 kg/m².      PHYSICAL EXAM:      General Appearance:   Alert, cooperative, no distress   HEENT:   Normocephalic, atraumatic, anicteric.     Neck:  Supple, symmetrical, trachea midline   Lungs:   Clear to auscultation bilaterally; no rales, rhonchi or wheezing; respirations unlabored    Heart::   Regular rate and rhythm; no murmur, rub, or gallop.   Abdomen:   Soft, " non-tender, non-distended; normal bowel sounds; no masses, no organomegaly    Genitalia:   Deferred    Rectal:   Deferred    Extremities:  No cyanosis, clubbing or edema    Pulses:  2+ and symmetric    Skin:  No jaundice, rashes, or lesions    Lymph nodes:  No palpable cervical lymphadenopathy        Lab Results:   No visits with results within 1 Day(s) from this visit.   Latest known visit with results is:   Admission on 07/02/2024, Discharged on 07/02/2024   Component Date Value    WBC 07/02/2024 13.83 (H)     RBC 07/02/2024 4.94     Hemoglobin 07/02/2024 14.1     Hematocrit 07/02/2024 43.3     MCV 07/02/2024 88     MCH 07/02/2024 28.5     MCHC 07/02/2024 32.6     RDW 07/02/2024 12.4     MPV 07/02/2024 13.7 (H)     Platelets 07/02/2024 214     nRBC 07/02/2024 0     Segmented % 07/02/2024 76 (H)     Immature Grans % 07/02/2024 0     Lymphocytes % 07/02/2024 16     Monocytes % 07/02/2024 8     Eosinophils Relative 07/02/2024 0     Basophils Relative 07/02/2024 0     Absolute Neutrophils 07/02/2024 10.46 (H)     Absolute Immature Grans 07/02/2024 0.06     Absolute Lymphocytes 07/02/2024 2.16     Absolute Monocytes 07/02/2024 1.06     Eosinophils Absolute 07/02/2024 0.05     Basophils Absolute 07/02/2024 0.04     hs TnI 0hr 07/02/2024 <2     Sodium 07/02/2024 139     Potassium 07/02/2024 3.6     Chloride 07/02/2024 105     CO2 07/02/2024 26     ANION GAP 07/02/2024 8     BUN 07/02/2024 8     Creatinine 07/02/2024 0.70     Glucose 07/02/2024 115     Calcium 07/02/2024 9.6     AST 07/02/2024 16     ALT 07/02/2024 21     Alkaline Phosphatase 07/02/2024 79     Total Protein 07/02/2024 7.9     Albumin 07/02/2024 4.3     Total Bilirubin 07/02/2024 1.24 (H)     eGFR 07/02/2024 104     Lipase 07/02/2024 21     EXT Preg Test, Ur 07/02/2024 Negative     Control 07/02/2024 Valid     hs TnI 2hr 07/02/2024 <2     Delta 2hr hsTnI 07/02/2024      Ventricular Rate 07/02/2024 94     Atrial Rate 07/02/2024 94     IA Interval 07/02/2024  128     QRSD Interval 07/02/2024 88     QT Interval 07/02/2024 376     QTC Interval 07/02/2024 470     P Axis 07/02/2024 23     QRS Axis 07/02/2024 -25     T Wave Axis 07/02/2024 16     Ventricular Rate 07/02/2024 71     Atrial Rate 07/02/2024 71     HI Interval 07/02/2024 134     QRSD Interval 07/02/2024 86     QT Interval 07/02/2024 418     QTC Interval 07/02/2024 454     P Axis 07/02/2024 17     QRS Axis 07/02/2024 -17     T Wave Axis 07/02/2024 -1          Radiology Results:   X-ray chest 1 view portable    Result Date: 7/3/2024  Narrative: XR CHEST PORTABLE INDICATION: chest pain. COMPARISON: None Single view FINDINGS: Clear lungs. No pneumothorax or pleural effusion. Normal cardiomediastinal silhouette. Bones are unremarkable for age. Normal upper abdomen.     Impression: No acute cardiopulmonary disease. Workstation performed: [id number]       This note was completed in part utilizing Dragon Software. Grammatical errors, random word insertions, spelling mistakes, and incomplete sentences may be an occasional consequence of this system secondary to software limitations, ambient noise, and hardware issues. If you have any questions or concerns about the content, text, or information contained within the body of this dictation, please contact the provider for clarification.

## 2024-07-15 ENCOUNTER — TELEPHONE (OUTPATIENT)
Dept: GASTROENTEROLOGY | Facility: MEDICAL CENTER | Age: 44
End: 2024-07-15

## 2024-07-15 NOTE — TELEPHONE ENCOUNTER
Called patient to confirm procedure for 07/31/2024 with Dr.Jaiyeola, patient has confirmed and had no questions at this time.

## 2024-07-31 ENCOUNTER — HOSPITAL ENCOUNTER (OUTPATIENT)
Dept: GASTROENTEROLOGY | Facility: HOSPITAL | Age: 44
Setting detail: OUTPATIENT SURGERY
Discharge: HOME | End: 2024-07-31
Attending: INTERNAL MEDICINE
Payer: COMMERCIAL

## 2024-07-31 ENCOUNTER — ANESTHESIA EVENT (OUTPATIENT)
Dept: GASTROENTEROLOGY | Facility: HOSPITAL | Age: 44
End: 2024-07-31

## 2024-07-31 ENCOUNTER — ANESTHESIA (OUTPATIENT)
Dept: GASTROENTEROLOGY | Facility: HOSPITAL | Age: 44
End: 2024-07-31

## 2024-07-31 VITALS
DIASTOLIC BLOOD PRESSURE: 58 MMHG | TEMPERATURE: 97.5 F | RESPIRATION RATE: 16 BRPM | HEART RATE: 77 BPM | SYSTOLIC BLOOD PRESSURE: 108 MMHG | OXYGEN SATURATION: 98 %

## 2024-07-31 DIAGNOSIS — Z12.11 SCREENING FOR COLON CANCER: ICD-10-CM

## 2024-07-31 DIAGNOSIS — R13.10 DYSPHAGIA, UNSPECIFIED TYPE: ICD-10-CM

## 2024-07-31 DIAGNOSIS — A04.8 H. PYLORI INFECTION: ICD-10-CM

## 2024-07-31 DIAGNOSIS — K21.9 GASTROESOPHAGEAL REFLUX DISEASE, UNSPECIFIED WHETHER ESOPHAGITIS PRESENT: ICD-10-CM

## 2024-07-31 LAB
EXT PREGNANCY TEST URINE: NEGATIVE
EXT. CONTROL: NORMAL

## 2024-07-31 PROCEDURE — 43249 ESOPH EGD DILATION <30 MM: CPT | Performed by: INTERNAL MEDICINE

## 2024-07-31 PROCEDURE — 88312 SPECIAL STAINS GROUP 1: CPT | Performed by: STUDENT IN AN ORGANIZED HEALTH CARE EDUCATION/TRAINING PROGRAM

## 2024-07-31 PROCEDURE — 88305 TISSUE EXAM BY PATHOLOGIST: CPT | Performed by: STUDENT IN AN ORGANIZED HEALTH CARE EDUCATION/TRAINING PROGRAM

## 2024-07-31 PROCEDURE — 45380 COLONOSCOPY AND BIOPSY: CPT | Performed by: INTERNAL MEDICINE

## 2024-07-31 PROCEDURE — 81025 URINE PREGNANCY TEST: CPT | Performed by: INTERNAL MEDICINE

## 2024-07-31 PROCEDURE — 43239 EGD BIOPSY SINGLE/MULTIPLE: CPT | Performed by: INTERNAL MEDICINE

## 2024-07-31 PROCEDURE — C1726 CATH, BAL DIL, NON-VASCULAR: HCPCS

## 2024-07-31 RX ORDER — LIDOCAINE HYDROCHLORIDE 10 MG/ML
0.5 INJECTION, SOLUTION EPIDURAL; INFILTRATION; INTRACAUDAL; PERINEURAL ONCE AS NEEDED
Status: DISCONTINUED | OUTPATIENT
Start: 2024-07-31 | End: 2024-08-04 | Stop reason: HOSPADM

## 2024-07-31 RX ORDER — SODIUM CHLORIDE, SODIUM LACTATE, POTASSIUM CHLORIDE, CALCIUM CHLORIDE 600; 310; 30; 20 MG/100ML; MG/100ML; MG/100ML; MG/100ML
125 INJECTION, SOLUTION INTRAVENOUS CONTINUOUS
Status: DISCONTINUED | OUTPATIENT
Start: 2024-07-31 | End: 2024-08-04 | Stop reason: HOSPADM

## 2024-07-31 RX ORDER — SODIUM CHLORIDE, SODIUM LACTATE, POTASSIUM CHLORIDE, CALCIUM CHLORIDE 600; 310; 30; 20 MG/100ML; MG/100ML; MG/100ML; MG/100ML
INJECTION, SOLUTION INTRAVENOUS CONTINUOUS PRN
Status: DISCONTINUED | OUTPATIENT
Start: 2024-07-31 | End: 2024-07-31

## 2024-07-31 RX ORDER — SODIUM CHLORIDE, SODIUM LACTATE, POTASSIUM CHLORIDE, CALCIUM CHLORIDE 600; 310; 30; 20 MG/100ML; MG/100ML; MG/100ML; MG/100ML
75 INJECTION, SOLUTION INTRAVENOUS CONTINUOUS
Status: DISCONTINUED | OUTPATIENT
Start: 2024-07-31 | End: 2024-08-04 | Stop reason: HOSPADM

## 2024-07-31 RX ORDER — PROPOFOL 10 MG/ML
INJECTION, EMULSION INTRAVENOUS AS NEEDED
Status: DISCONTINUED | OUTPATIENT
Start: 2024-07-31 | End: 2024-07-31

## 2024-07-31 RX ORDER — LIDOCAINE HYDROCHLORIDE 10 MG/ML
INJECTION, SOLUTION EPIDURAL; INFILTRATION; INTRACAUDAL; PERINEURAL AS NEEDED
Status: DISCONTINUED | OUTPATIENT
Start: 2024-07-31 | End: 2024-07-31

## 2024-07-31 RX ADMIN — PROPOFOL 200 MG: 10 INJECTION, EMULSION INTRAVENOUS at 07:52

## 2024-07-31 RX ADMIN — PROPOFOL 50 MG: 10 INJECTION, EMULSION INTRAVENOUS at 08:02

## 2024-07-31 RX ADMIN — PROPOFOL 50 MG: 10 INJECTION, EMULSION INTRAVENOUS at 07:58

## 2024-07-31 RX ADMIN — PROPOFOL 30 MG: 10 INJECTION, EMULSION INTRAVENOUS at 08:10

## 2024-07-31 RX ADMIN — Medication 40 MG: at 08:14

## 2024-07-31 RX ADMIN — SODIUM CHLORIDE, SODIUM LACTATE, POTASSIUM CHLORIDE, AND CALCIUM CHLORIDE: .6; .31; .03; .02 INJECTION, SOLUTION INTRAVENOUS at 07:38

## 2024-07-31 RX ADMIN — PROPOFOL 50 MG: 10 INJECTION, EMULSION INTRAVENOUS at 07:55

## 2024-07-31 RX ADMIN — SODIUM CHLORIDE, SODIUM LACTATE, POTASSIUM CHLORIDE, AND CALCIUM CHLORIDE 125 ML/HR: .6; .31; .03; .02 INJECTION, SOLUTION INTRAVENOUS at 07:38

## 2024-07-31 RX ADMIN — LIDOCAINE HYDROCHLORIDE 50 MG: 10 INJECTION, SOLUTION EPIDURAL; INFILTRATION; INTRACAUDAL; PERINEURAL at 07:52

## 2024-07-31 RX ADMIN — PROPOFOL 50 MG: 10 INJECTION, EMULSION INTRAVENOUS at 08:07

## 2024-07-31 NOTE — ANESTHESIA PREPROCEDURE EVALUATION
Procedure:  COLONOSCOPY  EGD    Relevant Problems   ANESTHESIA   (-) History of anesthesia complications      CARDIO   (-) Chest pain   (-) STYLES (dyspnea on exertion)      ENDO   (+) Type 2 diabetes mellitus without complication, without long-term current use of insulin ([facility])      GI/HEPATIC   (+) Dysphagia   (+) Gastroesophageal reflux disease      PULMONARY   (-) Sleep apnea   (-) URI (upper respiratory infection)      Other   (+) Class 3 severe obesity due to excess calories without serious comorbidity with body mass index (BMI) of 50.0 to 59.9 in adult ([facility])        Physical Exam    Airway    Mallampati score: II  TM Distance: >3 FB  Neck ROM: full     Dental       Cardiovascular      Pulmonary      Other Findings  post-pubertal.      Anesthesia Plan  ASA Score- 2     Anesthesia Type- IV sedation with anesthesia with ASA Monitors.         Additional Monitors:     Airway Plan:            Plan Factors-Exercise tolerance (METS): >4 METS.    Chart reviewed. EKG reviewed.  Existing labs reviewed. Patient summary reviewed.                  Induction- intravenous.    Postoperative Plan-         Informed Consent- Anesthetic plan and risks discussed with patient.  I personally reviewed this patient with the CRNA. Discussed and agreed on the Anesthesia Plan with the CRNA..

## 2024-08-02 DIAGNOSIS — R10.84 GENERALIZED ABDOMINAL CRAMPING: ICD-10-CM

## 2024-08-04 RX ORDER — DICYCLOMINE HCL 20 MG
20 TABLET ORAL EVERY 6 HOURS
Qty: 120 TABLET | Refills: 0 | Status: SHIPPED | OUTPATIENT
Start: 2024-08-04

## 2024-08-07 PROCEDURE — 88305 TISSUE EXAM BY PATHOLOGIST: CPT | Performed by: STUDENT IN AN ORGANIZED HEALTH CARE EDUCATION/TRAINING PROGRAM

## 2024-08-07 PROCEDURE — 88312 SPECIAL STAINS GROUP 1: CPT | Performed by: STUDENT IN AN ORGANIZED HEALTH CARE EDUCATION/TRAINING PROGRAM

## 2024-08-07 NOTE — RESULT ENCOUNTER NOTE
Please call the patient with the results    These let her know that her stomach biopsies were normal.  Her colon biopsies showed nonspecific inflammation.  If she is not having symptoms of diarrhea this does not require treatment.  She can repeat colonoscopy in 10 years for screening purposes.  She is scheduled for an office follow-up with me in January.

## 2024-10-21 ENCOUNTER — OFFICE VISIT (OUTPATIENT)
Dept: FAMILY MEDICINE CLINIC | Facility: CLINIC | Age: 44
End: 2024-10-21

## 2024-10-21 VITALS
RESPIRATION RATE: 18 BRPM | SYSTOLIC BLOOD PRESSURE: 126 MMHG | HEIGHT: 62 IN | BODY MASS INDEX: 43.47 KG/M2 | WEIGHT: 236.2 LBS | DIASTOLIC BLOOD PRESSURE: 78 MMHG | OXYGEN SATURATION: 98 % | HEART RATE: 83 BPM | TEMPERATURE: 98.3 F

## 2024-10-21 DIAGNOSIS — Z12.4 CERVICAL CANCER SCREENING: ICD-10-CM

## 2024-10-21 DIAGNOSIS — Z12.31 ENCOUNTER FOR SCREENING MAMMOGRAM FOR BREAST CANCER: ICD-10-CM

## 2024-10-21 DIAGNOSIS — R73.03 PREDIABETES: Primary | ICD-10-CM

## 2024-10-21 LAB — SL AMB POCT HEMOGLOBIN AIC: 6.1 (ref ?–6.5)

## 2024-10-21 RX ORDER — TRAZODONE HYDROCHLORIDE 50 MG/1
50 TABLET, FILM COATED ORAL
COMMUNITY
Start: 2024-10-16

## 2024-10-21 RX ORDER — PRAZOSIN HYDROCHLORIDE 1 MG/1
1 CAPSULE ORAL
COMMUNITY
Start: 2024-10-16

## 2024-10-21 RX ORDER — ESCITALOPRAM OXALATE 10 MG/1
10 TABLET ORAL DAILY
COMMUNITY
Start: 2024-10-16

## 2024-10-21 NOTE — PROGRESS NOTES
"Ambulatory Visit  Name: Saundra Ralph      : 1980      MRN: [id number]  Encounter Provider: Jacqueline Ding MD  Encounter Date: 10/21/2024   Encounter department: [facility]    Assessment & Plan  Prediabetes  A1c today 6.1.  Continue with low-carb diet and daily exercise.  Orders:    POCT hemoglobin A1c    Diabetic foot exam; Future    Albumin / creatinine urine ratio; Future    Encounter for screening mammogram for breast cancer    Orders:    Mammo screening bilateral w 3d and cad; Future    Cervical cancer screening  Anticipate Pap smear on 2024 at 8:40 AM.       BMI Counseling: Body mass index is 43.2 kg/m². The BMI is above normal. Nutrition recommendations include decreasing portion sizes, encouraging healthy choices of fruits and vegetables, decreasing fast food intake, consuming healthier snacks, limiting drinks that contain sugar, moderation in carbohydrate intake, increasing intake of lean protein, reducing intake of saturated and trans fat and reducing intake of cholesterol. Exercise recommendations include moderate physical activity 150 minutes/week. No pharmacotherapy was ordered. Rationale for BMI follow-up plan is due to patient being overweight or obese.     Depression Screening and Follow-up Plan: Patient was screened for depression during today's encounter. They screened negative with a PHQ-2 score of 0.      History of Present Illness     Saundra Ralph is a very pleasant 44 y.o. female who has a PMH of GERD, anxiety and depression, and obesity and presents today for diabetes follow-up.  She previously had a diagnosis of diabetes with A1c of 7, however she is currently not on medications.  She tries to adhere to low-carb diet and walks daily.  Follows with psychiatrist, last recent appointment was 10/16 with Dr Posada at [facility].  Mood has been stable on Lexapro.  Denies SI/HI.  Prazosin is helping her \"avoid thoughts " "from her past\".  She will bring information from psychiatrist at next visit. No current concerns.  No recent illness or sick contacts.            Review of Systems   Constitutional:  Negative for chills and fever.   HENT:  Negative for sore throat and trouble swallowing.    Eyes:  Negative for visual disturbance.   Respiratory:  Negative for chest tightness and shortness of breath.    Cardiovascular:  Negative for chest pain and palpitations.   Gastrointestinal:  Negative for abdominal distention, abdominal pain, blood in stool, constipation, diarrhea, nausea and vomiting.   Genitourinary:  Negative for dysuria and hematuria.   Musculoskeletal:  Negative for back pain, joint swelling and myalgias.   Skin:  Negative for rash.   Neurological:  Negative for dizziness, weakness, light-headedness and headaches.   Psychiatric/Behavioral:  Negative for agitation, confusion and decreased concentration.            Objective     /78 (BP Location: Right arm, Patient Position: Sitting, Cuff Size: Large)   Pulse 83   Temp 98.3 °F (36.8 °C) (Temporal)   Resp 18   Ht 5' 2\" (1.575 m)   Wt 107 kg (236 lb 3.2 oz)   SpO2 98%   BMI 43.20 kg/m²     Physical Exam  Vitals reviewed.   Constitutional:       General: She is not in acute distress.     Appearance: Normal appearance. She is obese.   HENT:      Nose: Nose normal.      Mouth/Throat:      Mouth: Mucous membranes are moist.   Eyes:      Extraocular Movements: Extraocular movements intact.   Cardiovascular:      Rate and Rhythm: Normal rate and regular rhythm.      Pulses: Normal pulses.      Heart sounds: Normal heart sounds.   Pulmonary:      Effort: Pulmonary effort is normal. No respiratory distress.      Breath sounds: Rales (right upper lung) present. No wheezing.   Chest:      Chest wall: No tenderness.   Abdominal:      General: Abdomen is flat. Bowel sounds are normal.      Palpations: Abdomen is soft.      Tenderness: There is no abdominal tenderness. "   Musculoskeletal:         General: Normal range of motion.      Cervical back: Normal range of motion.      Right lower leg: No edema.      Left lower leg: No edema.   Skin:     General: Skin is warm.      Capillary Refill: Capillary refill takes less than 2 seconds.   Neurological:      Mental Status: She is alert and oriented to person, place, and time.   Psychiatric:         Mood and Affect: Mood normal.         Behavior: Behavior normal.

## 2024-10-21 NOTE — ASSESSMENT & PLAN NOTE
A1c today 6.1.  Continue with low-carb diet and daily exercise.  Orders:    POCT hemoglobin A1c    Diabetic foot exam; Future    Albumin / creatinine urine ratio; Future

## 2024-10-28 DIAGNOSIS — M54.9 MID BACK PAIN ON RIGHT SIDE: ICD-10-CM

## 2024-10-28 RX ORDER — NAPROXEN 500 MG/1
500 TABLET ORAL 2 TIMES DAILY PRN
Qty: 30 TABLET | Refills: 1 | Status: SHIPPED | OUTPATIENT
Start: 2024-10-28

## 2024-10-28 RX ORDER — CYCLOBENZAPRINE HCL 10 MG
10 TABLET ORAL 2 TIMES DAILY PRN
Qty: 30 TABLET | Refills: 1 | Status: SHIPPED | OUTPATIENT
Start: 2024-10-28

## 2024-11-07 ENCOUNTER — APPOINTMENT (OUTPATIENT)
Dept: LAB | Facility: HOSPITAL | Age: 44
End: 2024-11-07
Payer: COMMERCIAL

## 2024-11-07 DIAGNOSIS — R73.03 PREDIABETES: ICD-10-CM

## 2024-11-07 LAB
CREAT UR-MCNC: 224.8 MG/DL
MICROALBUMIN UR-MCNC: 12.7 MG/L
MICROALBUMIN/CREAT 24H UR: 6 MG/G CREATININE (ref 0–30)

## 2024-11-07 PROCEDURE — 82043 UR ALBUMIN QUANTITATIVE: CPT

## 2024-11-07 PROCEDURE — 82570 ASSAY OF URINE CREATININE: CPT

## 2024-12-14 ENCOUNTER — VBI (OUTPATIENT)
Dept: ADMINISTRATIVE | Facility: OTHER | Age: 44
End: 2024-12-14

## 2024-12-14 NOTE — TELEPHONE ENCOUNTER
12/14/24 6:34 PM     Chart reviewed for Hemoglobin A1c was/were submitted to the patient's insurance.     Sandi Rivas MA   [facility]

## 2025-04-10 ENCOUNTER — VBI (OUTPATIENT)
Dept: ADMINISTRATIVE | Facility: OTHER | Age: 45
End: 2025-04-10

## 2025-04-16 DIAGNOSIS — M54.9 MID BACK PAIN ON RIGHT SIDE: ICD-10-CM

## 2025-04-17 RX ORDER — CYCLOBENZAPRINE HCL 10 MG
TABLET ORAL
Qty: 30 TABLET | Refills: 1 | Status: SHIPPED | OUTPATIENT
Start: 2025-04-17

## 2025-04-17 RX ORDER — NAPROXEN 500 MG/1
TABLET ORAL
Qty: 30 TABLET | Refills: 1 | Status: SHIPPED | OUTPATIENT
Start: 2025-04-17

## 2025-04-19 DIAGNOSIS — M54.9 MID BACK PAIN ON RIGHT SIDE: ICD-10-CM

## 2025-04-21 RX ORDER — CYCLOBENZAPRINE HCL 10 MG
TABLET ORAL
Qty: 30 TABLET | Refills: 1 | OUTPATIENT
Start: 2025-04-21

## 2025-05-09 DIAGNOSIS — M54.9 MID BACK PAIN ON RIGHT SIDE: ICD-10-CM

## 2025-05-09 RX ORDER — NAPROXEN 500 MG/1
TABLET ORAL
Qty: 30 TABLET | Refills: 1 | Status: SHIPPED | OUTPATIENT
Start: 2025-05-09

## 2025-06-06 DIAGNOSIS — M54.9 MID BACK PAIN ON RIGHT SIDE: ICD-10-CM

## 2025-06-06 RX ORDER — CYCLOBENZAPRINE HCL 10 MG
TABLET ORAL
Qty: 30 TABLET | Refills: 1 | Status: SHIPPED | OUTPATIENT
Start: 2025-06-06

## 2025-06-07 DIAGNOSIS — M54.9 MID BACK PAIN ON RIGHT SIDE: ICD-10-CM

## 2025-06-09 RX ORDER — CYCLOBENZAPRINE HCL 10 MG
TABLET ORAL
Qty: 30 TABLET | Refills: 1 | OUTPATIENT
Start: 2025-06-09

## 2025-06-24 DIAGNOSIS — M54.9 MID BACK PAIN ON RIGHT SIDE: ICD-10-CM

## 2025-06-25 RX ORDER — CYCLOBENZAPRINE HCL 10 MG
TABLET ORAL
Qty: 30 TABLET | Refills: 1 | OUTPATIENT
Start: 2025-06-25

## 2025-07-25 ENCOUNTER — VBI (OUTPATIENT)
Dept: ADMINISTRATIVE | Facility: OTHER | Age: 45
End: 2025-07-25

## 2025-07-25 NOTE — TELEPHONE ENCOUNTER
07/25/25 2:05 PM     Chart reviewed for Hemoglobin A1c ; nothing is submitted to the patient's insurance at this time.     Vicki Medellin   [facility]

## 2025-07-31 ENCOUNTER — VBI (OUTPATIENT)
Dept: ADMINISTRATIVE | Facility: OTHER | Age: 45
End: 2025-07-31